# Patient Record
Sex: FEMALE | Race: WHITE | HISPANIC OR LATINO | Employment: UNEMPLOYED | URBAN - METROPOLITAN AREA
[De-identification: names, ages, dates, MRNs, and addresses within clinical notes are randomized per-mention and may not be internally consistent; named-entity substitution may affect disease eponyms.]

---

## 2017-12-20 LAB
HBV SURFACE AG SERPL QL IA: NORMAL
HIV 1+2 AB+HIV1 P24 AG SERPL QL IA: NORMAL
RUBELLA ANTIBODY IGG QUANTITATIVE: NORMAL IU/ML

## 2018-07-17 LAB
GROUP B STREP PCR: NORMAL

## 2018-08-13 ENCOUNTER — HOSPITAL ENCOUNTER (INPATIENT)
Facility: CLINIC | Age: 28
LOS: 3 days | Discharge: HOME | End: 2018-08-16
Attending: ADVANCED PRACTICE MIDWIFE | Admitting: OBSTETRICS & GYNECOLOGY
Payer: COMMERCIAL

## 2018-08-13 ENCOUNTER — ANESTHESIA EVENT (OUTPATIENT)
Dept: OBGYN | Facility: CLINIC | Age: 28
End: 2018-08-13
Payer: COMMERCIAL

## 2018-08-13 ENCOUNTER — ANESTHESIA (OUTPATIENT)
Dept: OBGYN | Facility: CLINIC | Age: 28
End: 2018-08-13
Payer: COMMERCIAL

## 2018-08-13 DIAGNOSIS — Z98.891 S/P CESAREAN SECTION: Primary | ICD-10-CM

## 2018-08-13 PROBLEM — O16.9 HYPERTENSION IN PREGNANCY: Status: ACTIVE | Noted: 2018-08-13

## 2018-08-13 LAB
ABO + RH BLD: NORMAL
ABO + RH BLD: NORMAL
ALT SERPL W P-5'-P-CCNC: 39 U/L (ref 0–50)
ALT SERPL W P-5'-P-CCNC: 61 U/L (ref 0–50)
ANION GAP SERPL CALCULATED.3IONS-SCNC: 13 MMOL/L (ref 3–14)
ANION GAP SERPL CALCULATED.3IONS-SCNC: 15 MMOL/L (ref 3–14)
AST SERPL W P-5'-P-CCNC: 39 U/L (ref 0–45)
AST SERPL W P-5'-P-CCNC: 74 U/L (ref 0–45)
BASOPHILS # BLD AUTO: 0 10E9/L (ref 0–0.2)
BASOPHILS NFR BLD AUTO: 0.1 %
BLD GP AB SCN SERPL QL: NORMAL
BLOOD BANK CMNT PATIENT-IMP: NORMAL
BUN SERPL-MCNC: 13 MG/DL (ref 7–30)
BUN SERPL-MCNC: 16 MG/DL (ref 7–30)
CALCIUM SERPL-MCNC: 8.8 MG/DL (ref 8.5–10.1)
CALCIUM SERPL-MCNC: 9.1 MG/DL (ref 8.5–10.1)
CHLORIDE SERPL-SCNC: 103 MMOL/L (ref 94–109)
CHLORIDE SERPL-SCNC: 106 MMOL/L (ref 94–109)
CO2 SERPL-SCNC: 20 MMOL/L (ref 20–32)
CO2 SERPL-SCNC: 21 MMOL/L (ref 20–32)
CREAT SERPL-MCNC: 1.1 MG/DL (ref 0.52–1.04)
CREAT SERPL-MCNC: 1.35 MG/DL (ref 0.52–1.04)
CREAT UR-MCNC: 62 MG/DL
DIFFERENTIAL METHOD BLD: ABNORMAL
EOSINOPHIL # BLD AUTO: 0 10E9/L (ref 0–0.7)
EOSINOPHIL NFR BLD AUTO: 0.1 %
ERYTHROCYTE [DISTWIDTH] IN BLOOD BY AUTOMATED COUNT: 14.1 % (ref 10–15)
ERYTHROCYTE [DISTWIDTH] IN BLOOD BY AUTOMATED COUNT: 14.1 % (ref 10–15)
GFR SERPL CREATININE-BSD FRML MDRD: 47 ML/MIN/1.7M2
GFR SERPL CREATININE-BSD FRML MDRD: 60 ML/MIN/1.7M2
GLUCOSE SERPL-MCNC: 82 MG/DL (ref 70–99)
GLUCOSE SERPL-MCNC: 86 MG/DL (ref 70–99)
HCT VFR BLD AUTO: 33.6 % (ref 35–47)
HCT VFR BLD AUTO: 35.8 % (ref 35–47)
HGB BLD-MCNC: 11.4 G/DL (ref 11.7–15.7)
HGB BLD-MCNC: 12 G/DL (ref 11.7–15.7)
IMM GRANULOCYTES # BLD: 0.1 10E9/L (ref 0–0.4)
IMM GRANULOCYTES NFR BLD: 0.4 %
LYMPHOCYTES # BLD AUTO: 1.8 10E9/L (ref 0.8–5.3)
LYMPHOCYTES NFR BLD AUTO: 11.8 %
MCH RBC QN AUTO: 27 PG (ref 26.5–33)
MCH RBC QN AUTO: 27.2 PG (ref 26.5–33)
MCHC RBC AUTO-ENTMCNC: 33.5 G/DL (ref 31.5–36.5)
MCHC RBC AUTO-ENTMCNC: 33.9 G/DL (ref 31.5–36.5)
MCV RBC AUTO: 80 FL (ref 78–100)
MCV RBC AUTO: 80 FL (ref 78–100)
MONOCYTES # BLD AUTO: 0.9 10E9/L (ref 0–1.3)
MONOCYTES NFR BLD AUTO: 6.2 %
NEUTROPHILS # BLD AUTO: 12 10E9/L (ref 1.6–8.3)
NEUTROPHILS NFR BLD AUTO: 81.4 %
NRBC # BLD AUTO: 0.1 10*3/UL
NRBC BLD AUTO-RTO: 0 /100
PLATELET # BLD AUTO: 127 10E9/L (ref 150–450)
PLATELET # BLD AUTO: 145 10E9/L (ref 150–450)
POTASSIUM SERPL-SCNC: 3.8 MMOL/L (ref 3.4–5.3)
POTASSIUM SERPL-SCNC: 4.2 MMOL/L (ref 3.4–5.3)
PROT UR-MCNC: 0.1 G/L
PROT/CREAT 24H UR: 0.15 G/G CR (ref 0–0.2)
RBC # BLD AUTO: 4.19 10E12/L (ref 3.8–5.2)
RBC # BLD AUTO: 4.45 10E12/L (ref 3.8–5.2)
SODIUM SERPL-SCNC: 139 MMOL/L (ref 133–144)
SODIUM SERPL-SCNC: 139 MMOL/L (ref 133–144)
SPECIMEN EXP DATE BLD: NORMAL
T PALLIDUM AB SER QL: NONREACTIVE
URATE SERPL-MCNC: 7.6 MG/DL (ref 2.6–6)
WBC # BLD AUTO: 14.8 10E9/L (ref 4–11)
WBC # BLD AUTO: 16.2 10E9/L (ref 4–11)

## 2018-08-13 PROCEDURE — 3E0R3BZ INTRODUCTION OF ANESTHETIC AGENT INTO SPINAL CANAL, PERCUTANEOUS APPROACH: ICD-10-PCS | Performed by: ANESTHESIOLOGY

## 2018-08-13 PROCEDURE — 85025 COMPLETE CBC W/AUTO DIFF WBC: CPT | Performed by: ADVANCED PRACTICE MIDWIFE

## 2018-08-13 PROCEDURE — 36415 COLL VENOUS BLD VENIPUNCTURE: CPT | Performed by: ADVANCED PRACTICE MIDWIFE

## 2018-08-13 PROCEDURE — 10907ZC DRAINAGE OF AMNIOTIC FLUID, THERAPEUTIC FROM PRODUCTS OF CONCEPTION, VIA NATURAL OR ARTIFICIAL OPENING: ICD-10-PCS | Performed by: ADVANCED PRACTICE MIDWIFE

## 2018-08-13 PROCEDURE — 85027 COMPLETE CBC AUTOMATED: CPT | Performed by: ADVANCED PRACTICE MIDWIFE

## 2018-08-13 PROCEDURE — 86780 TREPONEMA PALLIDUM: CPT | Performed by: ADVANCED PRACTICE MIDWIFE

## 2018-08-13 PROCEDURE — 86900 BLOOD TYPING SEROLOGIC ABO: CPT | Performed by: ADVANCED PRACTICE MIDWIFE

## 2018-08-13 PROCEDURE — 84450 TRANSFERASE (AST) (SGOT): CPT | Performed by: ADVANCED PRACTICE MIDWIFE

## 2018-08-13 PROCEDURE — 84460 ALANINE AMINO (ALT) (SGPT): CPT | Performed by: ADVANCED PRACTICE MIDWIFE

## 2018-08-13 PROCEDURE — 12000030 ZZH R&B OB INTERMEDIATE UMMC

## 2018-08-13 PROCEDURE — 80048 BASIC METABOLIC PNL TOTAL CA: CPT | Performed by: ADVANCED PRACTICE MIDWIFE

## 2018-08-13 PROCEDURE — 25000131 ZZH RX MED GY IP 250 OP 636 PS 637: Performed by: ADVANCED PRACTICE MIDWIFE

## 2018-08-13 PROCEDURE — 86850 RBC ANTIBODY SCREEN: CPT | Performed by: ADVANCED PRACTICE MIDWIFE

## 2018-08-13 PROCEDURE — 84156 ASSAY OF PROTEIN URINE: CPT | Performed by: ADVANCED PRACTICE MIDWIFE

## 2018-08-13 PROCEDURE — 25000128 H RX IP 250 OP 636: Performed by: STUDENT IN AN ORGANIZED HEALTH CARE EDUCATION/TRAINING PROGRAM

## 2018-08-13 PROCEDURE — 25000125 ZZHC RX 250

## 2018-08-13 PROCEDURE — 82565 ASSAY OF CREATININE: CPT | Performed by: ADVANCED PRACTICE MIDWIFE

## 2018-08-13 PROCEDURE — 25000128 H RX IP 250 OP 636: Performed by: ADVANCED PRACTICE MIDWIFE

## 2018-08-13 PROCEDURE — 86901 BLOOD TYPING SEROLOGIC RH(D): CPT | Performed by: ADVANCED PRACTICE MIDWIFE

## 2018-08-13 PROCEDURE — 25000128 H RX IP 250 OP 636

## 2018-08-13 PROCEDURE — 84550 ASSAY OF BLOOD/URIC ACID: CPT | Performed by: ADVANCED PRACTICE MIDWIFE

## 2018-08-13 PROCEDURE — 82570 ASSAY OF URINE CREATININE: CPT | Performed by: ADVANCED PRACTICE MIDWIFE

## 2018-08-13 PROCEDURE — 00HU33Z INSERTION OF INFUSION DEVICE INTO SPINAL CANAL, PERCUTANEOUS APPROACH: ICD-10-PCS | Performed by: ANESTHESIOLOGY

## 2018-08-13 RX ORDER — EPHEDRINE SULFATE 50 MG/ML
5 INJECTION, SOLUTION INTRAMUSCULAR; INTRAVENOUS; SUBCUTANEOUS
Status: DISCONTINUED | OUTPATIENT
Start: 2018-08-13 | End: 2018-08-16 | Stop reason: HOSPADM

## 2018-08-13 RX ORDER — ONDANSETRON 2 MG/ML
4 INJECTION INTRAMUSCULAR; INTRAVENOUS EVERY 6 HOURS PRN
Status: DISCONTINUED | OUTPATIENT
Start: 2018-08-13 | End: 2018-08-14

## 2018-08-13 RX ORDER — MAGNESIUM SULFATE HEPTAHYDRATE 40 MG/ML
4 INJECTION, SOLUTION INTRAVENOUS
Status: DISCONTINUED | OUTPATIENT
Start: 2018-08-13 | End: 2018-08-16 | Stop reason: HOSPADM

## 2018-08-13 RX ORDER — NALBUPHINE HYDROCHLORIDE 10 MG/ML
2.5-5 INJECTION, SOLUTION INTRAMUSCULAR; INTRAVENOUS; SUBCUTANEOUS EVERY 6 HOURS PRN
Status: DISCONTINUED | OUTPATIENT
Start: 2018-08-13 | End: 2018-08-16 | Stop reason: HOSPADM

## 2018-08-13 RX ORDER — ACETAMINOPHEN 325 MG/1
650 TABLET ORAL EVERY 4 HOURS PRN
Status: DISCONTINUED | OUTPATIENT
Start: 2018-08-13 | End: 2018-08-14

## 2018-08-13 RX ORDER — NIFEDIPINE 10 MG/1
10 CAPSULE ORAL
Status: DISCONTINUED | OUTPATIENT
Start: 2018-08-13 | End: 2018-08-16 | Stop reason: HOSPADM

## 2018-08-13 RX ORDER — FENTANYL CITRATE 50 UG/ML
50-100 INJECTION, SOLUTION INTRAMUSCULAR; INTRAVENOUS
Status: DISCONTINUED | OUTPATIENT
Start: 2018-08-13 | End: 2018-08-14

## 2018-08-13 RX ORDER — CITRIC ACID/SODIUM CITRATE 334-500MG
30 SOLUTION, ORAL ORAL ONCE
Status: DISCONTINUED | OUTPATIENT
Start: 2018-08-13 | End: 2018-08-14

## 2018-08-13 RX ORDER — LIDOCAINE HYDROCHLORIDE 10 MG/ML
INJECTION, SOLUTION EPIDURAL; INFILTRATION; INTRACAUDAL; PERINEURAL
Status: DISCONTINUED
Start: 2018-08-13 | End: 2018-08-14 | Stop reason: HOSPADM

## 2018-08-13 RX ORDER — OXYTOCIN/0.9 % SODIUM CHLORIDE 30/500 ML
PLASTIC BAG, INJECTION (ML) INTRAVENOUS
Status: COMPLETED
Start: 2018-08-13 | End: 2018-08-13

## 2018-08-13 RX ORDER — NALBUPHINE HYDROCHLORIDE 10 MG/ML
10-20 INJECTION, SOLUTION INTRAMUSCULAR; INTRAVENOUS; SUBCUTANEOUS
Status: DISCONTINUED | OUTPATIENT
Start: 2018-08-13 | End: 2018-08-14

## 2018-08-13 RX ORDER — OXYTOCIN/0.9 % SODIUM CHLORIDE 30/500 ML
100-340 PLASTIC BAG, INJECTION (ML) INTRAVENOUS CONTINUOUS PRN
Status: COMPLETED | OUTPATIENT
Start: 2018-08-13 | End: 2018-08-14

## 2018-08-13 RX ORDER — MAGNESIUM SULFATE HEPTAHYDRATE 500 MG/ML
4 INJECTION, SOLUTION INTRAMUSCULAR; INTRAVENOUS
Status: DISCONTINUED | OUTPATIENT
Start: 2018-08-13 | End: 2018-08-16 | Stop reason: HOSPADM

## 2018-08-13 RX ORDER — OXYTOCIN/0.9 % SODIUM CHLORIDE 30/500 ML
1-24 PLASTIC BAG, INJECTION (ML) INTRAVENOUS CONTINUOUS
Status: DISCONTINUED | OUTPATIENT
Start: 2018-08-13 | End: 2018-08-16 | Stop reason: HOSPADM

## 2018-08-13 RX ORDER — LABETALOL HYDROCHLORIDE 5 MG/ML
40 INJECTION, SOLUTION INTRAVENOUS EVERY 10 MIN PRN
Status: DISCONTINUED | OUTPATIENT
Start: 2018-08-13 | End: 2018-08-16 | Stop reason: HOSPADM

## 2018-08-13 RX ORDER — LIDOCAINE HYDROCHLORIDE AND EPINEPHRINE 15; 5 MG/ML; UG/ML
INJECTION, SOLUTION EPIDURAL PRN
Status: DISCONTINUED | OUTPATIENT
Start: 2018-08-13 | End: 2018-08-15 | Stop reason: HOSPADM

## 2018-08-13 RX ORDER — MISOPROSTOL 200 UG/1
TABLET ORAL
Status: DISCONTINUED
Start: 2018-08-13 | End: 2018-08-14 | Stop reason: HOSPADM

## 2018-08-13 RX ORDER — MAGNESIUM SULFATE HEPTAHYDRATE 40 MG/ML
4 INJECTION, SOLUTION INTRAVENOUS ONCE
Status: COMPLETED | OUTPATIENT
Start: 2018-08-13 | End: 2018-08-13

## 2018-08-13 RX ORDER — LABETALOL HYDROCHLORIDE 5 MG/ML
20 INJECTION, SOLUTION INTRAVENOUS EVERY 10 MIN PRN
Status: DISCONTINUED | OUTPATIENT
Start: 2018-08-13 | End: 2018-08-16 | Stop reason: HOSPADM

## 2018-08-13 RX ORDER — IBUPROFEN 800 MG/1
800 TABLET, FILM COATED ORAL
Status: DISCONTINUED | OUTPATIENT
Start: 2018-08-13 | End: 2018-08-14

## 2018-08-13 RX ORDER — CARBOPROST TROMETHAMINE 250 UG/ML
250 INJECTION, SOLUTION INTRAMUSCULAR
Status: DISCONTINUED | OUTPATIENT
Start: 2018-08-13 | End: 2018-08-14

## 2018-08-13 RX ORDER — NALOXONE HYDROCHLORIDE 0.4 MG/ML
.1-.4 INJECTION, SOLUTION INTRAMUSCULAR; INTRAVENOUS; SUBCUTANEOUS
Status: DISCONTINUED | OUTPATIENT
Start: 2018-08-13 | End: 2018-08-16 | Stop reason: HOSPADM

## 2018-08-13 RX ORDER — MAGNESIUM SULFATE HEPTAHYDRATE 40 MG/ML
2 INJECTION, SOLUTION INTRAVENOUS
Status: DISCONTINUED | OUTPATIENT
Start: 2018-08-13 | End: 2018-08-16 | Stop reason: HOSPADM

## 2018-08-13 RX ORDER — NALOXONE HYDROCHLORIDE 0.4 MG/ML
.1-.4 INJECTION, SOLUTION INTRAMUSCULAR; INTRAVENOUS; SUBCUTANEOUS
Status: DISCONTINUED | OUTPATIENT
Start: 2018-08-13 | End: 2018-08-14

## 2018-08-13 RX ORDER — METHYLERGONOVINE MALEATE 0.2 MG/ML
200 INJECTION INTRAVENOUS
Status: DISCONTINUED | OUTPATIENT
Start: 2018-08-13 | End: 2018-08-14

## 2018-08-13 RX ORDER — PRENATAL VIT/IRON FUM/FOLIC AC 27MG-0.8MG
1 TABLET ORAL DAILY
Status: ON HOLD | COMMUNITY
End: 2018-08-16

## 2018-08-13 RX ORDER — SODIUM CHLORIDE, SODIUM LACTATE, POTASSIUM CHLORIDE, CALCIUM CHLORIDE 600; 310; 30; 20 MG/100ML; MG/100ML; MG/100ML; MG/100ML
INJECTION, SOLUTION INTRAVENOUS CONTINUOUS
Status: DISCONTINUED | OUTPATIENT
Start: 2018-08-13 | End: 2018-08-16 | Stop reason: HOSPADM

## 2018-08-13 RX ORDER — LORAZEPAM 2 MG/ML
2 INJECTION INTRAMUSCULAR
Status: DISCONTINUED | OUTPATIENT
Start: 2018-08-13 | End: 2018-08-16 | Stop reason: HOSPADM

## 2018-08-13 RX ORDER — LIDOCAINE 40 MG/G
CREAM TOPICAL
Status: DISCONTINUED | OUTPATIENT
Start: 2018-08-13 | End: 2018-08-14

## 2018-08-13 RX ORDER — MAGNESIUM SULFATE IN WATER 40 MG/ML
1.5 INJECTION, SOLUTION INTRAVENOUS CONTINUOUS
Status: DISCONTINUED | OUTPATIENT
Start: 2018-08-13 | End: 2018-08-15

## 2018-08-13 RX ORDER — CALCIUM GLUCONATE 94 MG/ML
1 INJECTION, SOLUTION INTRAVENOUS
Status: DISCONTINUED | OUTPATIENT
Start: 2018-08-13 | End: 2018-08-16 | Stop reason: HOSPADM

## 2018-08-13 RX ORDER — LABETALOL HYDROCHLORIDE 5 MG/ML
80 INJECTION, SOLUTION INTRAVENOUS EVERY 10 MIN PRN
Status: DISCONTINUED | OUTPATIENT
Start: 2018-08-13 | End: 2018-08-16 | Stop reason: HOSPADM

## 2018-08-13 RX ORDER — OXYTOCIN 10 [USP'U]/ML
10 INJECTION, SOLUTION INTRAMUSCULAR; INTRAVENOUS
Status: DISCONTINUED | OUTPATIENT
Start: 2018-08-13 | End: 2018-08-14

## 2018-08-13 RX ORDER — OXYCODONE AND ACETAMINOPHEN 5; 325 MG/1; MG/1
1 TABLET ORAL
Status: DISCONTINUED | OUTPATIENT
Start: 2018-08-13 | End: 2018-08-14

## 2018-08-13 RX ORDER — SODIUM CHLORIDE, SODIUM LACTATE, POTASSIUM CHLORIDE, CALCIUM CHLORIDE 600; 310; 30; 20 MG/100ML; MG/100ML; MG/100ML; MG/100ML
10-125 INJECTION, SOLUTION INTRAVENOUS CONTINUOUS
Status: DISCONTINUED | OUTPATIENT
Start: 2018-08-13 | End: 2018-08-16 | Stop reason: HOSPADM

## 2018-08-13 RX ORDER — OXYTOCIN 10 [USP'U]/ML
INJECTION, SOLUTION INTRAMUSCULAR; INTRAVENOUS
Status: DISCONTINUED
Start: 2018-08-13 | End: 2018-08-14 | Stop reason: HOSPADM

## 2018-08-13 RX ADMIN — MAGNESIUM SULFATE IN WATER 4 G: 40 INJECTION, SOLUTION INTRAVENOUS at 23:17

## 2018-08-13 RX ADMIN — Medication 20 MG: at 22:24

## 2018-08-13 RX ADMIN — FENTANYL CITRATE 50 MCG: 50 INJECTION, SOLUTION INTRAMUSCULAR; INTRAVENOUS at 13:07

## 2018-08-13 RX ADMIN — MAGNESIUM SULFATE IN WATER 2 G/HR: 40 INJECTION, SOLUTION INTRAVENOUS at 23:51

## 2018-08-13 RX ADMIN — Medication 2 MILLI-UNITS/MIN: at 15:39

## 2018-08-13 RX ADMIN — Medication 8 ML: at 14:47

## 2018-08-13 RX ADMIN — FENTANYL CITRATE 5 MCG: 50 INJECTION, SOLUTION INTRAMUSCULAR; INTRAVENOUS at 12:57

## 2018-08-13 RX ADMIN — SODIUM CHLORIDE, POTASSIUM CHLORIDE, SODIUM LACTATE AND CALCIUM CHLORIDE 250 ML: 600; 310; 30; 20 INJECTION, SOLUTION INTRAVENOUS at 18:56

## 2018-08-13 RX ADMIN — SODIUM CHLORIDE, POTASSIUM CHLORIDE, SODIUM LACTATE AND CALCIUM CHLORIDE 250 ML: 600; 310; 30; 20 INJECTION, SOLUTION INTRAVENOUS at 11:36

## 2018-08-13 RX ADMIN — LIDOCAINE HYDROCHLORIDE,EPINEPHRINE BITARTRATE 3 ML: 15; .005 INJECTION, SOLUTION EPIDURAL; INFILTRATION; INTRACAUDAL; PERINEURAL at 14:43

## 2018-08-13 RX ADMIN — SODIUM CHLORIDE, POTASSIUM CHLORIDE, SODIUM LACTATE AND CALCIUM CHLORIDE: 600; 310; 30; 20 INJECTION, SOLUTION INTRAVENOUS at 20:05

## 2018-08-13 RX ADMIN — Medication: at 14:40

## 2018-08-13 ASSESSMENT — ACTIVITIES OF DAILY LIVING (ADL)
DRESS: 0-->INDEPENDENT
COGNITION: 0 - NO COGNITION ISSUES REPORTED
TOILETING: 0-->INDEPENDENT
RETIRED_COMMUNICATION: 0-->UNDERSTANDS/COMMUNICATES WITHOUT DIFFICULTY
SWALLOWING: 0-->SWALLOWS FOODS/LIQUIDS WITHOUT DIFFICULTY
RETIRED_EATING: 0-->INDEPENDENT
FALL_HISTORY_WITHIN_LAST_SIX_MONTHS: NO
BATHING: 0-->INDEPENDENT
AMBULATION: 0-->INDEPENDENT
TRANSFERRING: 0-->INDEPENDENT

## 2018-08-13 NOTE — PROGRESS NOTES
Labor progress note    S: Pt has been coping well overall with labor but is starting to verbalize some frustration with the perceived length of labor. Noted change in energy with contractions - was previously able to chat/joke but is now having eyes closed and appears more focused.  FOB, , and mother in law remain supportive.  Pt tolerating clears, sipping on water but not as hungry.      O:  Blood pressure 140/90, temperature (P) 97.8  F (36.6  C), temperature source (P) Oral, resp. rate 18.  General appearance: uncomfortable with contractions but coping overall.  Contractions: Every 3-7 minutes. 60-90 seconds duration.  Palpate: moderate.  Soft resting tone.   FHT: Baseline 140 with moderate variability with periods of minimal. Accelerations not present. no decelerations present.  ROM: clear fluid. Membranes have been ruptured for 2 hours.  PELVIC EXAM:deferred    Pitocin- none,  Antibiotics- none  IV saline lock        # Pain Assessment:    - Sherlyn is experiencing pain due to labor. Pain management was discussed with Sherlyn and her significant other. Mother in law and doual and the plan was created in a collaborative fashion.  Robertjaimienarendra's response to the current recommendations: mixed response  - Non-pharmacologic adjuvants: Heat, Massage and positioning and continuous labor support from FOB and   - Encouraged continued position changes to facilitate fetal rotation/descnet including peanut ball, hands and knees, and rebozzo.  Pt agreed to try peanut ball.       A:  27 year old  with IUP @ 40w4d Pre eclampsia without severe features  Active labor   AROM <18 hours, clear, afebrile  Fetal Heart Rate Tracing category two  GBS- negative  Gestational Thrombocytopenia  Transfer from [facility] [place]    Patient Active Problem List   Diagnosis     Encounter for triage in pregnant patient     Hypertension in pregnancy         P:  Ambulation, hydration, position changes, birthing ball/sling and  tub options to facilitate labor. Pt assisted to left lateral with peanut ball.    Pain medication options reviewed with pt. Pt is interested in continued non pharmacologic options.  Declines hydrotherapy or certain recommended positioning.   Reviewed continued periods of  Category 2 FHR tracing, pt agreeable to IV fluid bolus and then continuous LR at 75ml as she is drinking less fluids. Continue close surveillance and consult MD if persistent or worsening category 2 FHR tracing despite interventions  Consider oepertaive Discuss labor augmentation with Pitocin prn if no cervical change with next SVE.    Reevaluate in 2-4 hours prn    Ernestina [facility] APRN, CNM        ADDENDUM 8/13/2018 1255  Pt noted tearful with contractions, verbalizing with each one.  Appears to be coping less effectively with current non pharmacologic options.  Pt allowed CNM to perform rebozzo x2 in hands and knees then stood and declined additional attempts.  Sat on birthing ball for ~2 contractions then stood again. Tried standing lunges for 1 contraction then stopped.  Declines hydrotherapy with bath or shower. Declines labor sling. Declines CUB or supported hands and knees.   Continued category 2 FHR tracing with periods of minimal variability. Pt consented to SVE - noted 6/80/-2 with bulging forebag.  AROM of forebag with pt consent for moderate clear fluid.  +scant bloody show.  Suspected OP position by SVE, confirmed with BSUS.  Reviewed with pt and family support recommendations for facilitating fetal rotation/descent including non pharmacologic options as above. Reviewed IV Fentanyl now that moderate variability returned.  Pt agreeable, will have IV Fentanyl and rest with peanut ball to facilitate fetal rotation/descent.   Plan recheck SVE in 2-4 hours and if no change plan pitocin augmentation if pt consents.    BPs remain mild range, asymptomatic - continue plan of care. Reviewed Category 2 FHR tracing with pt and family support, if  worsening despite interventions and remote from delivery consider operative delivery prn.  Ernestina Victoria APRN, CNM

## 2018-08-13 NOTE — PLAN OF CARE
Problem: Patient Care Overview  Goal: Plan of Care/Patient Progress Review  Outcome: No Change  Data: Patient presented to [facility] at 0634.   Reason for maternal/fetal assessment per patient is increases in BP's at [facility]- transfer of care to [facility].  Patient is a . Prenatal record reviewed.      Obstetric History       T0      L0     SAB0   TAB0   Ectopic0   Multiple0   Live Births0       # Outcome Date GA Lbr Sam/2nd Weight Sex Delivery Anes PTL Lv   1 Current               . Medical history: History reviewed. No pertinent past medical history.. Gestational Age 40w4d. VSS. Fetal movement present. Patient complains of contractions beginning during the day yesterday that worsened at about midnight last night. Patient went into [facility] at 0300 and her BP's were increasing (highest 150's diastolic) which warranted transfer of care. Patient was 5-6cm before transfer. Ernestina Victoria CNM notified of patient arrival and labor status- BP upon arrival 136/87. FHT: 150, minimal variability, no accelerations and no decelerations. Provider notified. Plan to give fluids, continue to monitor and anticipate . Contractions Q2-7 minutes and palpate moderate. Report given to Daksha Dorsey RN.

## 2018-08-13 NOTE — ANESTHESIA PROCEDURE NOTES
Epidural Procedure Note    Staff:     Anesthesiologist:  BUDDY DIEHL    Resident/CRNA:  LOPEZ MAHMOOD    Procedure performed by resident/CRNA in the presence of a teaching physician    Location: OB     Procedure start time:  8/13/2018 2:25 PM     Procedure end time:  8/13/2018 2:48 PM   Pre-procedure checklist:   patient identified, IV checked, site marked, risks and benefits discussed, informed consent, monitors and equipment checked, pre-op evaluation, at physician/surgeon's request and post-op pain management      Correct Patient: Yes      Correct Position: Yes      Correct Site: Yes      Correct Procedure: Yes      Correct Laterality:  Yes    Site Marked:  Yes  Procedure:     Procedure:  Epidural catheter    ASA:  2    Position:  Sitting    Sterile Prep: chloraprep      Insertion site:  L4-5    Local skin infiltration:  1% lidocaine    amount (mL):  4    Approach:  Midline    Needle gauge (G):  17    Needle Length (in):  3.5    Block Needle Type:  Touhy    Injection Technique:  LORT saline    LY at (cm):  7    Attempts:  2    Redirects:  1    Catheter gauge (G):  19    Catheter threaded easily: Yes      Threaded to cm at skin:  11    Threaded in epidural space (cm):  4    Paresthesias:  No    Aspiration negative for Heme or CSF: Yes       Local anesthetic:  Lidocaine 1.5% w/ 1:200,000 epinephrine    Test dose time:  14:43    Test dose negative for signs of intravascular, subdural or intrathecal injection: Yes

## 2018-08-13 NOTE — IP AVS SNAPSHOT
MRN:[id number]                      After Visit Summary   8/13/2018    Sherlyn Owusu    MRN: [id number]           Thank you!     Thank you for choosing [facility] for your care. Our goal is always to provide you with excellent care. Hearing back from our patients is one way we can continue to improve our services. Please take a few minutes to complete the written survey that you may receive in the mail after you visit with us. Thank you!        Patient Information     Date Of Birth          1990        Designated Caregiver       Most Recent Value    Caregiver    Will someone help with your care after discharge? no      About your hospital stay     You were admitted on:  August 13, 2018 You last received care in the:  [facility]    You were discharged on:  August 16, 2018        Reason for your hospital stay       Maternity care                  Who to Call     For medical emergencies, please call 911.  For non-urgent questions about your medical care, please call your primary care provider or clinic, None  For questions related to your surgery, please call your surgery clinic        Attending Provider     Provider Specialty    Alcides, Kodi Loera, JENNIFER Midwives    Ernestina Victoria, APRN CN Midwives    Roxanna Hansen, HERMANN CN OB/Gyn    Sandra Preciado MD OB/Gyn       Primary Care Provider Fax #    Physician No Ref-Primary [phone]      After Care Instructions     Activity       Review discharge instructions            Diet       Resume previous diet            Discharge Instructions - Hypertensive disorders patients       High Blood pressure patients to follow up in clinic or via home care within one week for a blood pressure check            Discharge Instructions - Postpartum visit       Schedule postpartum visit with your provider and return to clinic in 6 weeks.                  Follow-up Appointments     Follow Up and recommended labs  and tests       Follow up for blood pressure check and repeat labs (kidney function, liver function, blood count) on Monday or Tuesday                  Further instructions from your care team       Postop  Birth Instructions    Activity       Do not lift more than 10 pounds for 6 weeks after surgery.  Ask family and friends for help when you need it.    No driving until you have stopped taking your pain medications (usually two weeks after surgery).    No heavy exercise or activity for 6 weeks.  Don't do anything that will put a strain on your surgery site.    Don't strain when using the toilet.  Your care team may prescribe a stool softener if you have problems with your bowel movements.     To care for your incision:       Keep the incision clean and dry.    Do not soak your incision in water. No swimming or hot tubs until it has fully healed. You may soak in the bathtub if the water level is below your incision.    Do not use peroxide, gel, cream, lotion, or ointment on your incision.    Adjust your clothes to avoid pressure on your surgery site (check the elastic in your underwear for example).     You may see a small amount of clear or pink drainage and this is normal.  Check with your health care provider:       If the drainage increases or has an odor.    If the incision reddens, you have swelling, or develop a rash.    If you have increased pain and the medicine we prescribed doesn't help.    If you have a fever above 100.4 F (38 C) with or without chills when placing thermometer under your tongue.   The area around your incision (surgery wound), will feel numb.  This is normal. The numbness should go away in less than a year.     Keep your hands clean:  Always wash your hands before touching your incision (surgery wound). This helps reduce your risk of infection. If your hands aren't dirty, you may use an alcohol hand-rub to clean your hands. Keep your nails clean and short.    Call your healthcare  "provider if you have any of these symptoms:       You soak a sanitary pad with blood within 1 hour, or you see blood clots larger than a golf ball.    Bleeding that lasts more than 6 weeks.    Vaginal discharge that smells bad.    Severe pain, cramping or tenderness in your lower belly area.    A need to urinate more frequently (use the toilet more often), more urgently (use the toilet very quickly), or it burns when you urinate.    Nausea and vomiting.    Redness, swelling or pain around a vein in your leg.    Problems breastfeeding or a red or painful area on your breast.    Chest pain and cough or are gasping for air.    Problems with coping with sadness, anxiety or depression. If you have concerns about hurting yourself or the baby, call your provider immediately.      You have questions or concerns after you return home.                  Pending Results     No orders found from 8/11/2018 to 8/14/2018.            Statement of Approval     Ordered          08/16/18 1052  I have reviewed and agree with all the recommendations and orders detailed in this document.  EFFECTIVE NOW     Approved and electronically signed by:  Marah Antunez MD             Admission Information     Date & Time Provider Department Dept. Phone    8/13/2018 Sandra Preciado MD [facility] [phone]      Your Vitals Were     Blood Pressure Pulse Temperature Respirations Weight Pulse Oximetry    135/85 90 98.9  F (37.2  C) (Oral) 16 78.5 kg (173 lb 0.9 oz) 100%      MyChart Information     [employer] lets you send messages to your doctor, view your test results, renew your prescriptions, schedule appointments and more. To sign up, go to [email or web address] . Click on \"Log in\" on the left side of the screen, which will take you to the Welcome page. Then click on \"Sign up Now\" on the right side of the page.     You will be asked to enter the access code listed below, as well as some personal information. Please follow the directions " to create your username and password.     Your access code is: [email or web address]  Expires: 2018 11:18 AM     Your access code will  in 90 days. If you need help or a new code, please call your [facility] clinic or [phone].        Care EveryWhere ID     This is your Care EveryWhere ID. This could be used by other organizations to access your [facility] medical records  [id number]        Equal Access to Services     JEAN CHATTERJEE : Hadii liam spencer hadasho Soomaali, waaxda luqadaha, qaybta kaalmada adeegyada, waxtaylor gaffney hayregulo manuelbendayron bassett . So [facility] [phone].    ATENCIÓN: Si habla español, tiene a brooks disposición servicios gratuitos de asistencia lingüística. Keoame al [phone].    We comply with applicable federal civil rights laws and [place] laws. We do not discriminate on the basis of race, color, national origin, age, disability, sex, sexual orientation, or gender identity.               Review of your medicines      START taking        Dose / Directions    acetaminophen 325 MG tablet   Commonly known as:  TYLENOL        Dose:  650 mg   Start taking on:  2018   Take 2 tablets (650 mg) by mouth every 4 hours as needed for mild pain or pain   Quantity:  100 tablet   Refills:  0       ferrous sulfate 325 (65 Fe) MG tablet   Commonly known as:  IRON        Dose:  325 mg   Take 1 tablet (325 mg) by mouth daily (with breakfast)   Quantity:  90 tablet   Refills:  2       oxyCODONE IR 5 MG tablet   Commonly known as:  ROXICODONE        Dose:  5-10 mg   Take 1-2 tablets (5-10 mg) by mouth every 3 hours as needed for other (pain control or improvement in physical function. Hold dose for analgesic side effects.)   Quantity:  15 tablet   Refills:  0       senna-docusate 8.6-50 MG per tablet   Commonly known as:  SENOKOT-S;PERICOLACE        Dose:  2 tablet   Take 2 tablets by mouth 2 times daily as needed for constipation   Quantity:  100 tablet   Refills:  0         CONTINUE these medicines which  "have NOT CHANGED        Dose / Directions    prenatal multivitamin plus iron 27-0.8 MG Tabs per tablet        Dose:  1 tablet   Take 1 tablet by mouth daily   Quantity:  100 tablet   Refills:  3            Where to get your medicines      These medications were sent to [facility] [place], [place] - [address]  [address], [facility] [address]     Phone:  [phone]     acetaminophen 325 MG tablet    ferrous sulfate 325 (65 Fe) MG tablet    prenatal multivitamin plus iron 27-0.8 MG Tabs per tablet    senna-docusate 8.6-50 MG per tablet         Some of these will need a paper prescription and others can be bought over the counter. Ask your nurse if you have questions.     Bring a paper prescription for each of these medications     oxyCODONE IR 5 MG tablet                Protect others around you: Learn how to safely use, store and throw away your medicines at www.disposemymeds.org.        Information about your nerve block     Today you received a block to numb the nerves near your surgery site.    This is a block using local anesthetic or \"numbing\" medication injected around the nerves to anesthetize or \"numb\" the area supplied by those nerves. This block is injected into the muscle layer near your surgical site. The type of anesthesia (Exparel) your anesthesia team used to numb your abdomen may give you relief for up to 72 hours.     Diet: There are no diet restrictions, but you should drink plenty of fluids, unless you are on a fluid-restricted diet.     Activity: If your surgical site is an arm or leg you should be careful with your affected limb, since it is possible to injure your limb without being aware of it due to the numbing. Until full feeling returns, you should guard against bumping or hitting your limb, and avoid extreme hot or cold temperatures on the skin.    Pain Medication: As the block wears off, the feeling will return as a tingling or prickly sensation near " your surgical site. You will experience more discomfort from your incisions as the feeling returns. You may want to take a pain pill (a narcotic or Tylenol if this was prescribed by your surgeon) when you start to experience mild pain, before the pain becomes more severe. If your pain medications do not control your pain, you should notify your surgeon. If you are taking narcotics for pain management, do not drink alcohol, drive a car, or perform hazardous activities.  If you have questions or concerns you may call your surgeon at the number provided with your discharge instructions.     Call your surgeon if you experience blurry vision, ringing in the ears or metallic taste in your mouth.         Information about OPIOIDS     PRESCRIPTION OPIOIDS: WHAT YOU NEED TO KNOW   We gave you an opioid (narcotic) pain medicine. It is important to manage your pain, but opioids are not always the best choice. You should first try all the other options your care team gave you. Take this medicine for as short a time (and as few doses) as possible.    Some activities can increase your pain, such as bandage changes or therapy sessions. It may help to take your pain medicine 30 to 60 minutes before these activities. Reduce your stress by getting enough sleep, working on hobbies you enjoy and practicing relaxation or meditation. Talk to your care team about ways to manage your pain beyond prescription opioids.    These medicines have risks:    DO NOT drive when on new or higher doses of pain medicine. These medicines can affect your alertness and reaction times, and you could be arrested for driving under the influence (DUI). If you need to use opioids long-term, talk to your care team about driving.    DO NOT operate heavy machinery    DO NOT do any other dangerous activities while taking these medicines.    DO NOT drink any alcohol while taking these medicines.     If the opioid prescribed includes acetaminophen, DO NOT take with  any other medicines that contain acetaminophen. Read all labels carefully. Look for the word  acetaminophen  or  Tylenol.  Ask your pharmacist if you have questions or are unsure.    You can get addicted to pain medicines, especially if you have a history of addiction (chemical, alcohol or substance dependence). Talk to your care team about ways to reduce this risk.    All opioids tend to cause constipation. Drink plenty of water and eat foods that have a lot of fiber, such as fruits, vegetables, prune juice, apple juice and high-fiber cereal. Take a laxative (Miralax, milk of magnesia, Colace, Senna) if you don t move your bowels at least every other day. Other side effects include upset stomach, sleepiness, dizziness, throwing up, tolerance (needing more of the medicine to have the same effect), physical dependence and slowed breathing.    Store your pills in a secure place, locked if possible. We will not replace any lost or stolen medicine. If you don t finish your medicine, please throw away (dispose) as directed by your pharmacist. The [place] Pollution Control Agency has more information about safe disposal: https://www.[address].[facility].mn.us/living-green/managing-unwanted-medications             Medication List: This is a list of all your medications and when to take them. Check marks below indicate your daily home schedule. Keep this list as a reference.      Medications           Morning Afternoon Evening Bedtime As Needed    acetaminophen 325 MG tablet   Commonly known as:  TYLENOL   Take 2 tablets (650 mg) by mouth every 4 hours as needed for mild pain or pain   Start taking on:  8/17/2018   Last time this was given:  975 mg on 8/16/2018  5:19 AM                                ferrous sulfate 325 (65 Fe) MG tablet   Commonly known as:  IRON   Take 1 tablet (325 mg) by mouth daily (with breakfast)                                oxyCODONE IR 5 MG tablet   Commonly known as:  ROXICODONE   Take 1-2 tablets (5-10  mg) by mouth every 3 hours as needed for other (pain control or improvement in physical function. Hold dose for analgesic side effects.)   Last time this was given:  5 mg on 8/15/2018  7:38 PM                                prenatal multivitamin plus iron 27-0.8 MG Tabs per tablet   Take 1 tablet by mouth daily                                senna-docusate 8.6-50 MG per tablet   Commonly known as:  SENOKOT-S;PERICOLACE   Take 2 tablets by mouth 2 times daily as needed for constipation   Last time this was given:  2 tablets on 8/15/2018  7:38 PM

## 2018-08-13 NOTE — PROVIDER NOTIFICATION
08/13/18 1352   Provider Notification   Provider Name/Title ALDAIR [facility]   Method of Notification Electronic Page   Request Evaluate - Remote   Notification Reason Uterine Activity;Other (Comment)   I see orders for Pitocin- want it started now or?? Call me Thanks!

## 2018-08-13 NOTE — ANESTHESIA PREPROCEDURE EVALUATION
"Anesthesia Evaluation     .             ROS/MED HX    ENT/Pulmonary:  - neg pulmonary ROS     Neurologic:  - neg neurologic ROS     Cardiovascular:     (+) hypertension----. : . . . :. .       METS/Exercise Tolerance:     Hematologic:  - neg hematologic  ROS       Musculoskeletal:  - neg musculoskeletal ROS       GI/Hepatic:  - neg GI/hepatic ROS       Renal/Genitourinary:  - ROS Renal section negative       Endo:  - neg endo ROS       Psychiatric:  - neg psychiatric ROS       Infectious Disease:  - neg infectious disease ROS       Malignancy:      - no malignancy   Other:                     Physical Exam  Normal systems: cardiovascular    Airway   Mallampati: II  TM distance: >3 FB  Neck ROM: full    Dental     Cardiovascular       Pulmonary                     Anesthesia Plan      History & Physical Review      ASA Status:  2 .  OB Epidural Asa: 2       Plan for Epidural          Postoperative Care      Consents  Anesthetic plan, risks, benefits and alternatives discussed with:  Patient..      Medical history notable for Gestational Thrombocytopenia of 135 on 8/7/18, PCOS, Sickle Cell Carrier, Remote h/o UTI (noted last ~10 years ago)  - Prenatal chart problem list notable for \"asymptomatic varicose veins\".   ANALIA Perales MD  August 13, 2018      "

## 2018-08-13 NOTE — IP AVS SNAPSHOT
[facility]    [address] [address]    Phone:  [phone]                                       After Visit Summary   8/13/2018    Sherlyn Owusu    MRN: [id number]           After Visit Summary Signature Page     I have received my discharge instructions, and my questions have been answered. I have discussed any challenges I see with this plan with the nurse or doctor.    ..........................................................................................................................................  Patient/Patient Representative Signature      ..........................................................................................................................................  Patient Representative Print Name and Relationship to Patient    ..................................................               ................................................  Date                                            Time    ..........................................................................................................................................  Reviewed by Signature/Title    ...................................................              ..............................................  Date                                                            Time

## 2018-08-13 NOTE — PROVIDER NOTIFICATION
08/13/18 1109   Provider Notification   Provider Name/Title ALDAIR Victoria   Method of Notification Phone   Request Evaluate - Remote   Notification Reason Other (Comment)   ALDAIR Victoria called to tell this RN she is ordering a 250cc IV fluid bolus.

## 2018-08-13 NOTE — PROGRESS NOTES
Labor progress note    S: Pt coping well with labor with continuous labor support from FOB, , and Mother in law.  Is agreeable to SVE and consents to AROM if clinically indicated after risk/benefit.  No HA, visual changes, RUQ or epigastric pain. Tolerating regular diet.     O:  Blood pressure (!) 144/97, temperature 98.4  F (36.9  C), temperature source Oral, resp. rate 18.  General appearance: uncomfortable with contractions but coping.   Contractions: Every 2-5 minutes. 60-80 seconds duration.  Palpate: moderate.  Soft resting tone.   FHT: Baseline 145 with moderate variability, periods of minimal. Accelerations not present. no decelerations present.  ROM: AROM for small clear fluid.   PELVIC EXAM:6/ 80%/ Mid/ soft/ -1.  Moderate bloody how.     Pitocin- none,  Antibiotics- none  IV saline lock    Results for orders placed or performed during the hospital encounter of 08/13/18   CBC with platelets differential   Result Value Ref Range    WBC 14.8 (H) 4.0 - 11.0 10e9/L    RBC Count 4.45 3.8 - 5.2 10e12/L    Hemoglobin 12.0 11.7 - 15.7 g/dL    Hematocrit 35.8 35.0 - 47.0 %    MCV 80 78 - 100 fl    MCH 27.0 26.5 - 33.0 pg    MCHC 33.5 31.5 - 36.5 g/dL    RDW 14.1 10.0 - 15.0 %    Platelet Count 145 (L) 150 - 450 10e9/L    Diff Method Automated Method     % Neutrophils 81.4 %    % Lymphocytes 11.8 %    % Monocytes 6.2 %    % Eosinophils 0.1 %    % Basophils 0.1 %    % Immature Granulocytes 0.4 %    Nucleated RBCs 0 0 /100    Absolute Neutrophil 12.0 (H) 1.6 - 8.3 10e9/L    Absolute Lymphocytes 1.8 0.8 - 5.3 10e9/L    Absolute Monocytes 0.9 0.0 - 1.3 10e9/L    Absolute Eosinophils 0.0 0.0 - 0.7 10e9/L    Absolute Basophils 0.0 0.0 - 0.2 10e9/L    Abs Immature Granulocytes 0.1 0 - 0.4 10e9/L    Absolute Nucleated RBC 0.1    AST   Result Value Ref Range    AST 39 0 - 45 U/L   ALT   Result Value Ref Range    ALT 39 0 - 50 U/L   Uric acid   Result Value Ref Range    Uric Acid 7.6 (H) 2.6 - 6.0 mg/dL   Basic metabolic  panel   Result Value Ref Range    Sodium 139 133 - 144 mmol/L    Potassium 4.2 3.4 - 5.3 mmol/L    Chloride 106 94 - 109 mmol/L    Carbon Dioxide 20 20 - 32 mmol/L    Anion Gap 13 3 - 14 mmol/L    Glucose 86 70 - 99 mg/dL    Urea Nitrogen 13 7 - 30 mg/dL    Creatinine 1.10 (H) 0.52 - 1.04 mg/dL    GFR Estimate 60 (L) >60 mL/min/1.7m2    GFR Estimate If Black 73 >60 mL/min/1.7m2    Calcium 9.1 8.5 - 10.1 mg/dL   Protein  random urine with Creat Ratio   Result Value Ref Range    Protein Random Urine 0.10 g/L    Protein Total Urine g/gr Creatinine 0.15 0 - 0.2 g/g Cr   Creatinine urine calculation only   Result Value Ref Range    Creatinine Urine 62 mg/dL   ABO/Rh type and screen   Result Value Ref Range    ABO B     RH(D) Pos     Antibody Screen Neg     Test Valid Only At          [facility]    Specimen Expires 2018    Group B strep PCR   Result Value Ref Range    Group B Strep PCR neg    Group B strep PCR   Result Value Ref Range    Group B Strep PCR neg    Group B strep PCR   Result Value Ref Range    Group B Strep PCR neg              # Pain Assessment:    - Sherlyn is experiencing pain due to labor. Pain management was discussed with Sherlyn and her significant other, nova and mother in alw and the plan was created in a collaborative fashion.  Sherlyn's response to the current recommendations: engaged  - Non-pharmacologic adjuvants: Heat, Massage and Meditation and positioning.  - Briefly reviewed Nitrous Oxide per partner's request as pt was considering use at [facility]. Pt declines need at this time.         A:  27 year old  with IUP @ 40w4d Pre Eclampsia without severe fetaures  - active labor and minimal/no progress   - AROM <18 hours, clear, afebrile  Fetal Heart Rate Tracing category two  GBS- negative  Gestational Thrombocytopenia  Transfer from [facility]    Patient Active Problem List   Diagnosis     Encounter for triage in  pregnant patient     Hypertension in pregnancy         P:  - Ambulation, hydration, position changes, birthing ball/sling and tub options to facilitate labor.  Pt declines CNM continuous labor support as she has good support from  and FOB.   - Reviewed with pt and support persons GHTN vs Pre E diagnosis based on lab work - just borderline on labs.  IV saline lock in place.  If sustained severe range will discuss MD consult/Magnesium Sulfate prn  - Pain medication options reviewed with pt. Pt is interested in non pharmacologic options at present.   - Plan recheck SVE 4-6 hours and consider Pitocin augmentation prn if minimal progress.   - Continue close maternal surveillance for worsening s/s of Pre E.  Consult MD Carlos cardozon who is in house and consider operative delivery prn if remote from delivery  - Continue intrauterine resuscitation efforts and consult MD prn if worsening or persistent category 2 despite interventions. Consider operative delivery prn if remote from delivery.   - Reevaluate in 2 hours and prn    Ernestina MCCRARY, CRYSTAL

## 2018-08-13 NOTE — PROVIDER NOTIFICATION
08/13/18 1243   Provider Notification   Provider Name/Title ALDAIR Victoria   Method of Notification At Bedside   Request Evaluate in Person   Notification Reason Status Update  (ultrasound to verify position)   Direct OP

## 2018-08-13 NOTE — PLAN OF CARE
Problem: Patient Care Overview  Goal: Plan of Care/Patient Progress Review  Outcome: No Change  Patient transferred from birth center and care was assumed by this RN at 0715. Blood pressures 130s-150s/80s-90s. Afebrile throughout day. Denies headache, RUQ epigastric pain, visual changes. Most of the day baby had FHR variability less than 5 BPM. Intent on laboring without pain medication; decided on fentanyl at 1300; epidural at 1415. Voided independently at 1415.  Epidural placed and very effective pain relief at 1430. Plans to rest, orally hydrate. ,  and mother in law at bedside. Bedside report given to ROCIO Nixon.

## 2018-08-13 NOTE — PROGRESS NOTES
Labor progress note    S: Pt getting very uncomfortable after waking from Fentanyl rest.  Requesting more fentanyl or epidural.  Discussed at length not clinically appropriate for additional IV Fentanyl dose d/t FHR tracing.  Pt desires epidural.      O:  Blood pressure 136/87, temperature 98.4  F (36.9  C), temperature source Oral, resp. rate 18, SpO2 100 %.  General appearance: uncomfortable with contractions.  Contractions: Every 2-6 minutes. 60-90 seconds duration.  Palpate: moderate.  Soft resting tone.   FHT: Baseline 140 with moderate variability, periods of minimal. Accelerations are present. no decelerations present.  ROM: clear fluid. Membranes have been ruptured for 5 hours.  PELVIC EXAM:deferred      Pitocin- none,  Antibiotics- none  IV at maintenance rate        # Pain Assessment:    - Sherlyn is experiencing pain due to labor. Pain management was discussed with Sherlyn and her significant other. Mother in law and  and the plan was created in a collaborative fashion.  Sherlyn's response to the current recommendations: engaged  - Pt desires epidural.         A:  27 year old  with IUP @ 40w4d Pre Eclampsia without severe features  AROM<18 hours, afebrile, clear  OP presentation   Fetal Heart Rate Tracing category two  GBS- negative  Gestational Thrombocytopenia  Transfer from [facility]    Patient Active Problem List   Diagnosis     Encounter for triage in pregnant patient     Hypertension in pregnancy         P:  Ambulation, hydration, position changes, birthing ball/sling and tub options to facilitate labor.  Pain medication options reviewed with pt. Pt is interested in epidural.  Provider to Provider report given to anesthesia.   Observation and reevaluate in 1 hours prn  Recheck SVE at 4 hours s/p last check and plan labor augmentation with Pitocin   MD consultant on call Carlos reviewed FHR tracing, agrees with current plan but  Low threshold prn if worsening category 2  FHR tracing despite interventions.       Ernestina MCCRARY, JENNIFERM

## 2018-08-14 ENCOUNTER — SURGERY (OUTPATIENT)
Age: 28
End: 2018-08-14

## 2018-08-14 ENCOUNTER — ANESTHESIA EVENT (OUTPATIENT)
Dept: OBGYN | Facility: CLINIC | Age: 28
End: 2018-08-14
Payer: COMMERCIAL

## 2018-08-14 ENCOUNTER — ANESTHESIA (OUTPATIENT)
Dept: OBGYN | Facility: CLINIC | Age: 28
End: 2018-08-14
Payer: COMMERCIAL

## 2018-08-14 PROBLEM — Z98.891 S/P CESAREAN SECTION: Status: ACTIVE | Noted: 2018-08-14

## 2018-08-14 LAB
ALBUMIN UR-MCNC: NEGATIVE MG/DL
ALT SERPL W P-5'-P-CCNC: 33 U/L (ref 0–50)
ALT SERPL W P-5'-P-CCNC: 38 U/L (ref 0–50)
APPEARANCE UR: CLEAR
AST SERPL W P-5'-P-CCNC: 45 U/L (ref 0–45)
AST SERPL W P-5'-P-CCNC: 52 U/L (ref 0–45)
BACTERIA #/AREA URNS HPF: ABNORMAL /HPF
BILIRUB UR QL STRIP: NEGATIVE
COLOR UR AUTO: ABNORMAL
CREAT SERPL-MCNC: 1.41 MG/DL (ref 0.52–1.04)
CREAT SERPL-MCNC: 1.44 MG/DL (ref 0.52–1.04)
CREAT UR-MCNC: 47 MG/DL
ERYTHROCYTE [DISTWIDTH] IN BLOOD BY AUTOMATED COUNT: 14.4 % (ref 10–15)
ERYTHROCYTE [DISTWIDTH] IN BLOOD BY AUTOMATED COUNT: 14.4 % (ref 10–15)
GFR SERPL CREATININE-BSD FRML MDRD: 44 ML/MIN/1.7M2
GFR SERPL CREATININE-BSD FRML MDRD: 45 ML/MIN/1.7M2
GLUCOSE UR STRIP-MCNC: NEGATIVE MG/DL
HCT VFR BLD AUTO: 23.8 % (ref 35–47)
HCT VFR BLD AUTO: 24.2 % (ref 35–47)
HGB BLD-MCNC: 8 G/DL (ref 11.7–15.7)
HGB BLD-MCNC: 8.1 G/DL (ref 11.7–15.7)
HGB UR QL STRIP: NEGATIVE
HYALINE CASTS #/AREA URNS LPF: 2 /LPF (ref 0–2)
KETONES UR STRIP-MCNC: NEGATIVE MG/DL
LACTATE BLD-SCNC: 4.1 MMOL/L (ref 0.7–2)
LEUKOCYTE ESTERASE UR QL STRIP: NEGATIVE
MAGNESIUM SERPL-MCNC: 7.6 MG/DL (ref 1.6–2.3)
MCH RBC QN AUTO: 26.8 PG (ref 26.5–33)
MCH RBC QN AUTO: 27 PG (ref 26.5–33)
MCHC RBC AUTO-ENTMCNC: 33.5 G/DL (ref 31.5–36.5)
MCHC RBC AUTO-ENTMCNC: 33.6 G/DL (ref 31.5–36.5)
MCV RBC AUTO: 80 FL (ref 78–100)
MCV RBC AUTO: 80 FL (ref 78–100)
NITRATE UR QL: NEGATIVE
PH UR STRIP: 5 PH (ref 5–7)
PLATELET # BLD AUTO: 113 10E9/L (ref 150–450)
PLATELET # BLD AUTO: 122 10E9/L (ref 150–450)
RBC # BLD AUTO: 2.96 10E12/L (ref 3.8–5.2)
RBC # BLD AUTO: 3.02 10E12/L (ref 3.8–5.2)
RBC #/AREA URNS AUTO: 1 /HPF (ref 0–2)
SOURCE: ABNORMAL
SP GR UR STRIP: 1 (ref 1–1.03)
UROBILINOGEN UR STRIP-MCNC: NORMAL MG/DL (ref 0–2)
WBC # BLD AUTO: 24.7 10E9/L (ref 4–11)
WBC # BLD AUTO: 26.5 10E9/L (ref 4–11)
WBC #/AREA URNS AUTO: <1 /HPF (ref 0–5)

## 2018-08-14 PROCEDURE — 25000128 H RX IP 250 OP 636: Performed by: ANESTHESIOLOGY

## 2018-08-14 PROCEDURE — 87086 URINE CULTURE/COLONY COUNT: CPT | Performed by: STUDENT IN AN ORGANIZED HEALTH CARE EDUCATION/TRAINING PROGRAM

## 2018-08-14 PROCEDURE — 85027 COMPLETE CBC AUTOMATED: CPT | Performed by: STUDENT IN AN ORGANIZED HEALTH CARE EDUCATION/TRAINING PROGRAM

## 2018-08-14 PROCEDURE — 37000009 ZZH ANESTHESIA TECHNICAL FEE, EACH ADDTL 15 MIN: Performed by: OBSTETRICS & GYNECOLOGY

## 2018-08-14 PROCEDURE — C9290 INJ, BUPIVACAINE LIPOSOME: HCPCS | Performed by: ANESTHESIOLOGY

## 2018-08-14 PROCEDURE — 84450 TRANSFERASE (AST) (SGOT): CPT | Performed by: STUDENT IN AN ORGANIZED HEALTH CARE EDUCATION/TRAINING PROGRAM

## 2018-08-14 PROCEDURE — 25000132 ZZH RX MED GY IP 250 OP 250 PS 637: Performed by: STUDENT IN AN ORGANIZED HEALTH CARE EDUCATION/TRAINING PROGRAM

## 2018-08-14 PROCEDURE — 71000015 ZZH RECOVERY PHASE 1 LEVEL 2 EA ADDTL HR: Performed by: OBSTETRICS & GYNECOLOGY

## 2018-08-14 PROCEDURE — 27110028 ZZH OR GENERAL SUPPLY NON-STERILE: Performed by: OBSTETRICS & GYNECOLOGY

## 2018-08-14 PROCEDURE — 25000128 H RX IP 250 OP 636: Performed by: NURSE ANESTHETIST, CERTIFIED REGISTERED

## 2018-08-14 PROCEDURE — 83735 ASSAY OF MAGNESIUM: CPT | Performed by: STUDENT IN AN ORGANIZED HEALTH CARE EDUCATION/TRAINING PROGRAM

## 2018-08-14 PROCEDURE — 37000008 ZZH ANESTHESIA TECHNICAL FEE, 1ST 30 MIN: Performed by: OBSTETRICS & GYNECOLOGY

## 2018-08-14 PROCEDURE — 12000032 ZZH R&B OB CRITICAL UMMC

## 2018-08-14 PROCEDURE — 84460 ALANINE AMINO (ALT) (SGPT): CPT | Performed by: STUDENT IN AN ORGANIZED HEALTH CARE EDUCATION/TRAINING PROGRAM

## 2018-08-14 PROCEDURE — 25000128 H RX IP 250 OP 636: Performed by: STUDENT IN AN ORGANIZED HEALTH CARE EDUCATION/TRAINING PROGRAM

## 2018-08-14 PROCEDURE — 36000057 ZZH SURGERY LEVEL 3 1ST 30 MIN - UMMC: Performed by: OBSTETRICS & GYNECOLOGY

## 2018-08-14 PROCEDURE — 40000170 ZZH STATISTIC PRE-PROCEDURE ASSESSMENT II: Performed by: OBSTETRICS & GYNECOLOGY

## 2018-08-14 PROCEDURE — 25000125 ZZHC RX 250: Performed by: ADVANCED PRACTICE MIDWIFE

## 2018-08-14 PROCEDURE — 36000059 ZZH SURGERY LEVEL 3 EA 15 ADDTL MIN UMMC: Performed by: OBSTETRICS & GYNECOLOGY

## 2018-08-14 PROCEDURE — 27210794 ZZH OR GENERAL SUPPLY STERILE: Performed by: OBSTETRICS & GYNECOLOGY

## 2018-08-14 PROCEDURE — 82565 ASSAY OF CREATININE: CPT | Performed by: STUDENT IN AN ORGANIZED HEALTH CARE EDUCATION/TRAINING PROGRAM

## 2018-08-14 PROCEDURE — 83605 ASSAY OF LACTIC ACID: CPT | Performed by: STUDENT IN AN ORGANIZED HEALTH CARE EDUCATION/TRAINING PROGRAM

## 2018-08-14 PROCEDURE — 81001 URINALYSIS AUTO W/SCOPE: CPT | Performed by: STUDENT IN AN ORGANIZED HEALTH CARE EDUCATION/TRAINING PROGRAM

## 2018-08-14 PROCEDURE — 71000014 ZZH RECOVERY PHASE 1 LEVEL 2 FIRST HR: Performed by: OBSTETRICS & GYNECOLOGY

## 2018-08-14 PROCEDURE — 25000125 ZZHC RX 250: Performed by: NURSE ANESTHETIST, CERTIFIED REGISTERED

## 2018-08-14 PROCEDURE — 40000977 ZZH STATISTIC ATTENDANCE AT DELIVERY

## 2018-08-14 PROCEDURE — 40000275 ZZH STATISTIC RCP TIME EA 10 MIN

## 2018-08-14 PROCEDURE — 25000125 ZZHC RX 250: Performed by: ANESTHESIOLOGY

## 2018-08-14 PROCEDURE — 36415 COLL VENOUS BLD VENIPUNCTURE: CPT | Performed by: STUDENT IN AN ORGANIZED HEALTH CARE EDUCATION/TRAINING PROGRAM

## 2018-08-14 PROCEDURE — 25000128 H RX IP 250 OP 636: Performed by: ADVANCED PRACTICE MIDWIFE

## 2018-08-14 RX ORDER — SODIUM CHLORIDE, SODIUM LACTATE, POTASSIUM CHLORIDE, CALCIUM CHLORIDE 600; 310; 30; 20 MG/100ML; MG/100ML; MG/100ML; MG/100ML
INJECTION, SOLUTION INTRAVENOUS CONTINUOUS
Status: CANCELLED | OUTPATIENT
Start: 2018-08-14

## 2018-08-14 RX ORDER — BUPIVACAINE HYDROCHLORIDE 7.5 MG/ML
INJECTION, SOLUTION INTRASPINAL PRN
Status: DISCONTINUED | OUTPATIENT
Start: 2018-08-14 | End: 2018-08-14

## 2018-08-14 RX ORDER — DIPHENHYDRAMINE HCL 25 MG
25 CAPSULE ORAL EVERY 6 HOURS PRN
Status: DISCONTINUED | OUTPATIENT
Start: 2018-08-14 | End: 2018-08-16 | Stop reason: HOSPADM

## 2018-08-14 RX ORDER — ACETAMINOPHEN 325 MG/1
975 TABLET ORAL EVERY 8 HOURS
Status: DISCONTINUED | OUTPATIENT
Start: 2018-08-14 | End: 2018-08-16 | Stop reason: HOSPADM

## 2018-08-14 RX ORDER — ACETAMINOPHEN 325 MG/1
650 TABLET ORAL EVERY 4 HOURS PRN
Status: DISCONTINUED | OUTPATIENT
Start: 2018-08-17 | End: 2018-08-16 | Stop reason: HOSPADM

## 2018-08-14 RX ORDER — HYDROCORTISONE 2.5 %
CREAM (GRAM) TOPICAL 3 TIMES DAILY PRN
Status: DISCONTINUED | OUTPATIENT
Start: 2018-08-14 | End: 2018-08-16 | Stop reason: HOSPADM

## 2018-08-14 RX ORDER — OXYTOCIN/0.9 % SODIUM CHLORIDE 30/500 ML
PLASTIC BAG, INJECTION (ML) INTRAVENOUS CONTINUOUS PRN
Status: DISCONTINUED | OUTPATIENT
Start: 2018-08-14 | End: 2018-08-14

## 2018-08-14 RX ORDER — OXYTOCIN/0.9 % SODIUM CHLORIDE 30/500 ML
340 PLASTIC BAG, INJECTION (ML) INTRAVENOUS CONTINUOUS PRN
Status: DISCONTINUED | OUTPATIENT
Start: 2018-08-14 | End: 2018-08-16 | Stop reason: HOSPADM

## 2018-08-14 RX ORDER — CEFAZOLIN SODIUM 1 G/3ML
1 INJECTION, POWDER, FOR SOLUTION INTRAMUSCULAR; INTRAVENOUS SEE ADMIN INSTRUCTIONS
Status: CANCELLED | OUTPATIENT
Start: 2018-08-14

## 2018-08-14 RX ORDER — NALOXONE HYDROCHLORIDE 0.4 MG/ML
.1-.4 INJECTION, SOLUTION INTRAMUSCULAR; INTRAVENOUS; SUBCUTANEOUS
Status: DISCONTINUED | OUTPATIENT
Start: 2018-08-14 | End: 2018-08-16 | Stop reason: HOSPADM

## 2018-08-14 RX ORDER — LIDOCAINE 40 MG/G
CREAM TOPICAL
Status: DISCONTINUED | OUTPATIENT
Start: 2018-08-14 | End: 2018-08-16 | Stop reason: HOSPADM

## 2018-08-14 RX ORDER — AMOXICILLIN 250 MG
2 CAPSULE ORAL 2 TIMES DAILY PRN
Status: DISCONTINUED | OUTPATIENT
Start: 2018-08-14 | End: 2018-08-16 | Stop reason: HOSPADM

## 2018-08-14 RX ORDER — BUPIVACAINE HYDROCHLORIDE 2.5 MG/ML
INJECTION, SOLUTION EPIDURAL; INFILTRATION; INTRACAUDAL PRN
Status: DISCONTINUED | OUTPATIENT
Start: 2018-08-14 | End: 2018-08-14

## 2018-08-14 RX ORDER — SIMETHICONE 80 MG
80 TABLET,CHEWABLE ORAL 4 TIMES DAILY PRN
Status: DISCONTINUED | OUTPATIENT
Start: 2018-08-14 | End: 2018-08-16 | Stop reason: HOSPADM

## 2018-08-14 RX ORDER — SODIUM CHLORIDE, SODIUM LACTATE, POTASSIUM CHLORIDE, CALCIUM CHLORIDE 600; 310; 30; 20 MG/100ML; MG/100ML; MG/100ML; MG/100ML
INJECTION, SOLUTION INTRAVENOUS CONTINUOUS PRN
Status: DISCONTINUED | OUTPATIENT
Start: 2018-08-14 | End: 2018-08-14

## 2018-08-14 RX ORDER — SODIUM CHLORIDE, SODIUM LACTATE, POTASSIUM CHLORIDE, CALCIUM CHLORIDE 600; 310; 30; 20 MG/100ML; MG/100ML; MG/100ML; MG/100ML
INJECTION, SOLUTION INTRAVENOUS CONTINUOUS
Status: DISCONTINUED | OUTPATIENT
Start: 2018-08-14 | End: 2018-08-14 | Stop reason: HOSPADM

## 2018-08-14 RX ORDER — DEXTROSE, SODIUM CHLORIDE, SODIUM LACTATE, POTASSIUM CHLORIDE, AND CALCIUM CHLORIDE 5; .6; .31; .03; .02 G/100ML; G/100ML; G/100ML; G/100ML; G/100ML
INJECTION, SOLUTION INTRAVENOUS CONTINUOUS
Status: DISCONTINUED | OUTPATIENT
Start: 2018-08-14 | End: 2018-08-16 | Stop reason: HOSPADM

## 2018-08-14 RX ORDER — BISACODYL 10 MG
10 SUPPOSITORY, RECTAL RECTAL DAILY PRN
Status: DISCONTINUED | OUTPATIENT
Start: 2018-08-16 | End: 2018-08-16 | Stop reason: HOSPADM

## 2018-08-14 RX ORDER — MORPHINE SULFATE 1 MG/ML
INJECTION, SOLUTION EPIDURAL; INTRATHECAL; INTRAVENOUS PRN
Status: DISCONTINUED | OUTPATIENT
Start: 2018-08-14 | End: 2018-08-14

## 2018-08-14 RX ORDER — ACETAMINOPHEN 325 MG/1
975 TABLET ORAL ONCE
Status: CANCELLED | OUTPATIENT
Start: 2018-08-14 | End: 2018-08-14

## 2018-08-14 RX ORDER — CARBOPROST TROMETHAMINE 250 UG/ML
INJECTION, SOLUTION INTRAMUSCULAR PRN
Status: DISCONTINUED | OUTPATIENT
Start: 2018-08-14 | End: 2018-08-14

## 2018-08-14 RX ORDER — CEFAZOLIN SODIUM 2 G/100ML
2 INJECTION, SOLUTION INTRAVENOUS
Status: CANCELLED | OUTPATIENT
Start: 2018-08-14

## 2018-08-14 RX ORDER — FENTANYL CITRATE 50 UG/ML
INJECTION, SOLUTION INTRAMUSCULAR; INTRAVENOUS PRN
Status: DISCONTINUED | OUTPATIENT
Start: 2018-08-14 | End: 2018-08-14

## 2018-08-14 RX ORDER — MISOPROSTOL 200 UG/1
400 TABLET ORAL
Status: DISCONTINUED | OUTPATIENT
Start: 2018-08-14 | End: 2018-08-16 | Stop reason: HOSPADM

## 2018-08-14 RX ORDER — DIPHENHYDRAMINE HYDROCHLORIDE 50 MG/ML
25 INJECTION INTRAMUSCULAR; INTRAVENOUS EVERY 6 HOURS PRN
Status: DISCONTINUED | OUTPATIENT
Start: 2018-08-14 | End: 2018-08-16 | Stop reason: HOSPADM

## 2018-08-14 RX ORDER — ONDANSETRON 2 MG/ML
4 INJECTION INTRAMUSCULAR; INTRAVENOUS EVERY 30 MIN PRN
Status: DISCONTINUED | OUTPATIENT
Start: 2018-08-14 | End: 2018-08-14 | Stop reason: HOSPADM

## 2018-08-14 RX ORDER — FENTANYL CITRATE 50 UG/ML
25-50 INJECTION, SOLUTION INTRAMUSCULAR; INTRAVENOUS
Status: DISCONTINUED | OUTPATIENT
Start: 2018-08-14 | End: 2018-08-14 | Stop reason: HOSPADM

## 2018-08-14 RX ORDER — ONDANSETRON 4 MG/1
4 TABLET, ORALLY DISINTEGRATING ORAL EVERY 30 MIN PRN
Status: DISCONTINUED | OUTPATIENT
Start: 2018-08-14 | End: 2018-08-14 | Stop reason: HOSPADM

## 2018-08-14 RX ORDER — ONDANSETRON 2 MG/ML
4 INJECTION INTRAMUSCULAR; INTRAVENOUS EVERY 6 HOURS PRN
Status: DISCONTINUED | OUTPATIENT
Start: 2018-08-14 | End: 2018-08-16 | Stop reason: HOSPADM

## 2018-08-14 RX ORDER — AMOXICILLIN 250 MG
1 CAPSULE ORAL 2 TIMES DAILY PRN
Status: DISCONTINUED | OUTPATIENT
Start: 2018-08-14 | End: 2018-08-16 | Stop reason: HOSPADM

## 2018-08-14 RX ORDER — OXYTOCIN 10 [USP'U]/ML
10 INJECTION, SOLUTION INTRAMUSCULAR; INTRAVENOUS
Status: DISCONTINUED | OUTPATIENT
Start: 2018-08-14 | End: 2018-08-16 | Stop reason: HOSPADM

## 2018-08-14 RX ORDER — NALOXONE HYDROCHLORIDE 0.4 MG/ML
.1-.4 INJECTION, SOLUTION INTRAMUSCULAR; INTRAVENOUS; SUBCUTANEOUS
Status: ACTIVE | OUTPATIENT
Start: 2018-08-14 | End: 2018-08-15

## 2018-08-14 RX ORDER — CITRIC ACID/SODIUM CITRATE 334-500MG
30 SOLUTION, ORAL ORAL
Status: CANCELLED | OUTPATIENT
Start: 2018-08-14

## 2018-08-14 RX ORDER — LANOLIN 100 %
OINTMENT (GRAM) TOPICAL
Status: DISCONTINUED | OUTPATIENT
Start: 2018-08-14 | End: 2018-08-16 | Stop reason: HOSPADM

## 2018-08-14 RX ORDER — ONDANSETRON 2 MG/ML
INJECTION INTRAMUSCULAR; INTRAVENOUS PRN
Status: DISCONTINUED | OUTPATIENT
Start: 2018-08-14 | End: 2018-08-14

## 2018-08-14 RX ORDER — BUPIVACAINE HYDROCHLORIDE 7.5 MG/ML
INJECTION, SOLUTION INTRASPINAL
Status: DISCONTINUED
Start: 2018-08-14 | End: 2018-08-14 | Stop reason: HOSPADM

## 2018-08-14 RX ORDER — CEFAZOLIN SODIUM 1 G/3ML
INJECTION, POWDER, FOR SOLUTION INTRAMUSCULAR; INTRAVENOUS PRN
Status: DISCONTINUED | OUTPATIENT
Start: 2018-08-14 | End: 2018-08-14

## 2018-08-14 RX ORDER — OXYTOCIN/0.9 % SODIUM CHLORIDE 30/500 ML
100 PLASTIC BAG, INJECTION (ML) INTRAVENOUS CONTINUOUS
Status: DISCONTINUED | OUTPATIENT
Start: 2018-08-14 | End: 2018-08-16 | Stop reason: HOSPADM

## 2018-08-14 RX ORDER — OXYCODONE HYDROCHLORIDE 5 MG/1
5-10 TABLET ORAL
Status: DISCONTINUED | OUTPATIENT
Start: 2018-08-14 | End: 2018-08-16 | Stop reason: HOSPADM

## 2018-08-14 RX ADMIN — PHENYLEPHRINE HYDROCHLORIDE 50 MCG: 10 INJECTION, SOLUTION INTRAMUSCULAR; INTRAVENOUS; SUBCUTANEOUS at 02:35

## 2018-08-14 RX ADMIN — PHENYLEPHRINE HYDROCHLORIDE 0.5 MCG/KG/MIN: 10 INJECTION, SOLUTION INTRAMUSCULAR; INTRAVENOUS; SUBCUTANEOUS at 02:08

## 2018-08-14 RX ADMIN — BUPIVACAINE 20 ML: 13.3 INJECTION, SUSPENSION, LIPOSOMAL INFILTRATION at 03:50

## 2018-08-14 RX ADMIN — PHENYLEPHRINE HYDROCHLORIDE 100 MCG: 10 INJECTION, SOLUTION INTRAMUSCULAR; INTRAVENOUS; SUBCUTANEOUS at 02:08

## 2018-08-14 RX ADMIN — Medication 100 ML/HR: at 04:24

## 2018-08-14 RX ADMIN — SENNOSIDES AND DOCUSATE SODIUM 1 TABLET: 8.6; 5 TABLET ORAL at 20:28

## 2018-08-14 RX ADMIN — MAGNESIUM SULFATE IN WATER 2 G/HR: 40 INJECTION, SOLUTION INTRAVENOUS at 12:16

## 2018-08-14 RX ADMIN — SENNOSIDES AND DOCUSATE SODIUM 2 TABLET: 8.6; 5 TABLET ORAL at 10:39

## 2018-08-14 RX ADMIN — CEFAZOLIN 2 G: 1 INJECTION, POWDER, FOR SOLUTION INTRAMUSCULAR; INTRAVENOUS at 02:30

## 2018-08-14 RX ADMIN — OXYTOCIN-SODIUM CHLORIDE 0.9% IV SOLN 30 UNIT/500ML 600 ML/HR: 30-0.9/5 SOLUTION at 02:27

## 2018-08-14 RX ADMIN — SODIUM CHLORIDE, POTASSIUM CHLORIDE, SODIUM LACTATE AND CALCIUM CHLORIDE: 600; 310; 30; 20 INJECTION, SOLUTION INTRAVENOUS at 22:57

## 2018-08-14 RX ADMIN — SODIUM CHLORIDE, POTASSIUM CHLORIDE, SODIUM LACTATE AND CALCIUM CHLORIDE 75 ML/HR: 600; 310; 30; 20 INJECTION, SOLUTION INTRAVENOUS at 09:27

## 2018-08-14 RX ADMIN — CARBOPROST TROMETHAMINE 250 MCG: 250 INJECTION, SOLUTION INTRAMUSCULAR at 02:38

## 2018-08-14 RX ADMIN — ACETAMINOPHEN 975 MG: 325 TABLET, FILM COATED ORAL at 19:02

## 2018-08-14 RX ADMIN — SODIUM CHLORIDE, POTASSIUM CHLORIDE, SODIUM LACTATE AND CALCIUM CHLORIDE 125 ML/HR: 600; 310; 30; 20 INJECTION, SOLUTION INTRAVENOUS at 01:13

## 2018-08-14 RX ADMIN — SODIUM CHLORIDE, POTASSIUM CHLORIDE, SODIUM LACTATE AND CALCIUM CHLORIDE: 600; 310; 30; 20 INJECTION, SOLUTION INTRAVENOUS at 01:46

## 2018-08-14 RX ADMIN — MORPHINE SULFATE 0.15 MG: 1 INJECTION, SOLUTION EPIDURAL; INTRATHECAL; INTRAVENOUS at 02:05

## 2018-08-14 RX ADMIN — AZITHROMYCIN MONOHYDRATE 500 MG: 500 INJECTION, POWDER, LYOPHILIZED, FOR SOLUTION INTRAVENOUS at 02:00

## 2018-08-14 RX ADMIN — ACETAMINOPHEN 975 MG: 325 TABLET, FILM COATED ORAL at 10:38

## 2018-08-14 RX ADMIN — SODIUM CHLORIDE, POTASSIUM CHLORIDE, SODIUM LACTATE AND CALCIUM CHLORIDE: 600; 310; 30; 20 INJECTION, SOLUTION INTRAVENOUS at 20:29

## 2018-08-14 RX ADMIN — BUPIVACAINE HYDROCHLORIDE 20 ML: 2.5 INJECTION, SOLUTION EPIDURAL; INFILTRATION; INTRACAUDAL at 03:50

## 2018-08-14 RX ADMIN — BUPIVACAINE HYDROCHLORIDE IN DEXTROSE 1.6 ML: 7.5 INJECTION, SOLUTION SUBARACHNOID at 02:05

## 2018-08-14 RX ADMIN — FENTANYL CITRATE 15 MCG: 50 INJECTION, SOLUTION INTRAMUSCULAR; INTRAVENOUS at 02:05

## 2018-08-14 RX ADMIN — SIMETHICONE CHEW TAB 80 MG 80 MG: 80 TABLET ORAL at 10:39

## 2018-08-14 RX ADMIN — ONDANSETRON 4 MG: 2 INJECTION INTRAMUSCULAR; INTRAVENOUS at 02:15

## 2018-08-14 NOTE — ANESTHESIA PROCEDURE NOTES
Spinal/LP Procedure Note    Spinal Block  Staff:     Anesthesiologist:  MACO SHELTON  Location: OB and OR  Procedure Start/Stop Times:      8/14/2018 1:55 AM     8/14/2018 2:05 AM    patient identified, IV checked, site marked, risks and benefits discussed, informed consent, monitors and equipment checked, pre-op evaluation, at physician/surgeon's request and post-op pain management      Correct Patient: Yes      Correct Position: Yes      Correct Site: Yes      Correct Procedure: Yes      Correct Laterality:  Yes    Site Marked:  Yes  Procedure:     Procedure:  Intrathecal    ASA:  2    Position:  Sitting    Sterile Prep: chloraprep, mask and sterile gloves      Insertion site:  L3-4    Approach:  Midline    Needle Type:  Tj    Needle gauge (G):  25    Local Skin Infiltration:  2% lidocaine    amount (ml):  3    Needle Length (in):  3.5    Introducer used: Yes      Introducer gauge:  20 G    Attempts:  2    Redirects:  0    CSF:  Clear    Paresthesias:  No

## 2018-08-14 NOTE — PROGRESS NOTES
Magnesium Check  S:  Patient reports feeling well aside from dizziness. No h/a, vision change, c/p, sob, RUQ abdominal pain, or n/v. Breastfeeding baby during interview.     O:  Vitals:    18 1156 18 1225 18 1258 18 1356   BP: 116/76 115/73 104/49 115/47   Pulse:       Resp: 16 16  16   Temp:  98.8  F (37.1  C)  98.4  F (36.9  C)   TempSrc:  Oral  Oral   SpO2: 100% 100%  99%        Gen: Resting comfortably, NAD  CV: RRR, no murmurs  Resp: Non-labored breathing, CTAB  Abd: Soft, no RUQ tenderness. Very mild fundal tenderness, appropriate postop  Extrem: 2+ edema BLE, no calf tenderness  Neuro: 2+ patellar reflexes b/l, 1 beat of clonus.    UOP: 1000 mL over 2 hours    A/P:  Sherlyn Owusu is a 27 year old  at 40w5d who is PPD#0 s/p PLTCS c/b preeclampsia with severe features.     Preeclampsia w/ severe features:  - BP's normotensive, continue to monitor and treat severe range prn  - S/p Mag 4g load> 2g/hr, now running at 1.5 g/hr given elevated Cr and last Mg level of 7.6. No s/s of mag toxicity.   - HELLP labs Cr 1.1>1.35>1.44, AST 39>61>38, ALT 39>74>52, Plts 145>127>113. Will continue to trend HELLP labs q6h given worsening creatinine, next at 1800.   - Plan to discontinue magnesium at 24 hours postpartum  - Strict I&O, UOP excellent  - Repeat HELLP labs pending, down-trending appropriately.  - Continue q4h clinical mag checks, next at 1930.    Elevated WBC:  WBC 26.5 up from 16.2 yesterday. Pt afebrile, no fundal tenderness, no other signs of infection. Will get lactate with 1800 labs as well as UCx. Continue to monitor closely. Will pursue further infectious workup if she develops fever. No indication for abx at this time.     Tachycardia:  HR from 100's-120's since delivery. QBL 2200 mL Hgb went from 11.4 to 8.0. Suspect ABLA as cause, but consider EKG if continued tachycardia. Currently stable. Discussed possibility of blood transfusion tomorrow if continued  dizziness and downtrending Hgb. Pt agreeable.     Namrata Griffith MD  Ob/Gyn PGY-2  08/14/18 3:18 PM

## 2018-08-14 NOTE — PROVIDER NOTIFICATION
08/13/18 2009   Provider Notification   Provider Name/Title KALYN Hansen   Method of Notification At Bedside   Request Evaluate in Person   Notification Reason Decels     FHR had a prolonged deceleration at 1944, lasted 4min, katherine 90. Resolved after repositioning to hands&knees, starting IV fluid bolus and 15L 02 via non-rebreather mask. KALYN Hansen CNM was at the bedside along with this writer during the deceleration. Will continue to monitor.

## 2018-08-14 NOTE — PLAN OF CARE
"Problem: Patient Care Overview  Goal: Plan of Care/Patient Progress Review  Outcome: Therapy, progress toward functional goals as expected  VSS, postpartum assessments are WNL ex tachycardia throughout the day from 110s-120s. She is afebrile, reports weakness and feeling \"dizzy,\" HgB 8.0 this AM. Incision dressing is c/d/i. Voiding with palma WNL, output is very good. She is eating and drinking normally. Not passing gas yet. Denies headache, vision changes, RUQ pain, shortness of breath. Magnesium level earlier today was 7.6, per provider order Mag was turned down to 1.5 g/hr from 2g/hr and she reports feeling more comfortable (less hot and less thirsty). Reflexes are +2, no clonus. She is breastfeeding her infant independently and is in very good spirits despite her birth experience not going according to her initial plan. Her  is present and very supportive. Will continue with plan of care.       "

## 2018-08-14 NOTE — PLAN OF CARE
Problem: Patient Care Overview  Goal: Plan of Care/Patient Progress Review  Outcome: Improving  Pt ctx q2-4min, palpate strong. Pitocin infusing at 4ml/hr, titrated per orders. FHR had periods of minimal variability intermittently throughout evening. Dr. Preciado at pt bedside and recommended a  at 2235. KALYN Hansen checked pt cervix at 2243 and it was a lip/rim. Due to this the plan changed and pt began pushing at 2248. BPs were severe range and 20mg IV labetalol given at 2224 per orders. 4g loading dose of magnesium started at 2317. Report given to ROCIO Grande at bedside.

## 2018-08-14 NOTE — PROVIDER NOTIFICATION
08/14/18 1250   Provider Notification   Provider Name/Title Dr. Griffith   Method of Notification Electronic Page   Request Evaluate-Remote   Notification Reason Lab Results     Hi, her Mag level came back 7.6.

## 2018-08-14 NOTE — PROGRESS NOTES
"[facility] Labor and Delivery Progress Note    Sherlyn Owusu MRN# [id number]   Age: 27 year old YOB: 1990           Subjective:   Tired, but pushing with good effort. \"I just want to be done.\"            Objective:   Patient Vitals for the past 8 hrs:   BP Temp Temp src Resp SpO2   18 0025 144/88 99.4  F (37.4  C) Oral 18 -   18 2355 157/78 - - - -   18 2350 162/88 - - - -   18 2339 (!) 156/91 - - - -   18 2329 135/77 - - - 100 %   18 2323 157/90 - - - 90 %   18 2300 (!) 160/93 - - - 100 %   18 2251 (!) 155/95 - - - 100 %   18 2240 (!) 175/99 - - - 100 %   18 2227 (!) 158/109 - - - 100 %   18 2216 (!) 183/105 - - - 100 %   18 2201 (!) 166/93 99.8  F (37.7  C) Oral - 100 %   18 2146 162/84 - - - 100 %   18 2130 (!) 163/104 - - - 100 %   18 2100 124/80 - - - 100 %   18 139/77 - - - 100 %   18 - 98.9  F (37.2  C) Oral - -   18 1957 140/84 - - - 100 %   18 1930 134/80 - - - 100 %   18 1906 160/85 - - - 100 %   18 1830 (!) 122/103 - - - 100 %   18 1800 - 99.3  F (37.4  C) Oral - -   18 1755 (!) 151/96 - - - 100 %   18 1752 (!) 145/93 - - - 99 %        Cervical Exam: Complete and +1 station   Caput present     Membranes: Leaking clear fluid     Fetal Heart Rate:    Monitor: External US    Variability: Minimal variability. Early decelerations and late decelerations present.    Baseline Rate: 140 bpm   [place]: contractions every 1-2 minutes, lasting 60-80 seconds   Pitocin at 12mU         Assessment:   Sherlyn Johnsonormack is a 27 year old  who is 40w5d   Transfer from birth center  Pre-eclampsia with severe features   Magnesium sulfate for seizure prophylaxis   Labor dystocia, little fetal descent over two hours of pushing   Fetal Heart Rate Tracing: category 2         Plan:   Dr. Preciado consult, in room with " patient  Recommends . Discussed risks, benefits of  versus continuing.   Patient and family agree with .   Will continue to offer support as needed.   HERMANN NogeuraM

## 2018-08-14 NOTE — PROVIDER NOTIFICATION
08/14/18 1233   Provider Notification   Provider Name/Title Dr. Griffith   Method of Notification Electronic Page   Request Evaluate-Remote   Notification Reason Vital Signs Change     Her temp was 98.8, and I checked again 15 mins later just in case it was 98.6. She does feel hot though, and has been very thirsty. Hgb is now 8.0.

## 2018-08-14 NOTE — PROGRESS NOTES
[facility] Labor and Delivery Progress Note    Sherlyn Owusu MRN# [id number]   Age: 27 year old YOB: 1990           Subjective:   Pushing for past 1 hour. , , and mother in law in room for support. Continues to have relief with epidural. Denies HA, vision changes/blurred vision, or RUQ pain.            Objective:   Patient Vitals for the past 8 hrs:   BP Temp Temp src SpO2   18 2339 (!) 156/91 - - -   18 2329 135/77 - - 100 %   18 2323 157/90 - - 90 %   18 2300 (!) 160/93 - - 100 %   18 2251 (!) 155/95 - - 100 %   18 2240 (!) 175/99 - - 100 %   18 2227 (!) 158/109 - - 100 %   18 2216 (!) 183/105 - - 100 %   18 2201 (!) 166/93 99.8  F (37.7  C) Oral 100 %   18 2146 162/84 - - 100 %   18 2130 (!) 163/104 - - 100 %   18 2100 124/80 - - 100 %   18 2020 139/77 - - 100 %   18 2000 - 98.9  F (37.2  C) Oral -   18 1957 140/84 - - 100 %   18 1930 134/80 - - 100 %   18 1906 160/85 - - 100 %   18 1830 (!) 122/103 - - 100 %   18 1800 - 99.3  F (37.4  C) Oral -   18 1755 (!) 151/96 - - 100 %   18 1752 (!) 145/93 - - 99 %   18 1650 137/86 - - 99 %   18 1620 (!) 144/92 - - 100 %      Pre-eclampsia labs repeated at 2230:  AST: 74  ALT: 61  Plt: 127   Protein random urine with creatinine ratio: pending     Cervical Exam: 10 / 100/ 0     Membranes: Leaking clear fluid     Fetal Heart Rate:    Monitor: External US    Variability: Moderate. Accelerations present. Variable decelerations.    Baseline Rate: 145 bpm   [place]: contractions every 2-4 minutes, lasting  seconds. Moderate.   Pitocin at 6mU        Assessment:   Sherlyn Owusu is a 27 year old  who is 40w5d    Second stage labor - pushing x1 hour   Labor augmentation due to dysfunctional labor   Pre-eclampsia with severe features   Magnesium sulfate for seizure prophylaxis    Fetal Heart Rate Tracing: category 2   GBS negative         Plan:   Dr Preciado and Dr Davis aware of patient status  Continue to facilitate pushing efforts  Monitor labor progress and FHR closely.   Anticipate   HERMANN Noguera CNM

## 2018-08-14 NOTE — ANESTHESIA POSTPROCEDURE EVALUATION
Patient: Sherlyn Owusu    Procedure(s):   - Wound Class: II-Clean Contaminated    Diagnosis:Pregnancy  Diagnosis Additional Information: No value filed.    Anesthesia Type:  Spinal, Periph. Nerve Block for postop pain    Note:  Anesthesia Post Evaluation    Patient location during evaluation: PACU  Patient participation: Able to participate in evaluation but full recovery from regional anesthesia has not yet ocurrred but is anticipated to occur within 48 hours  Level of consciousness: awake and alert  Pain management: adequate  Airway patency: patent  Cardiovascular status: hemodynamically stable  Respiratory status: nonlabored ventilation, spontaneous ventilation and room air  Hydration status: euvolemic  PONV: none     Anesthetic complications: None          Last vitals:  Vitals:    08/14/18 0025 08/14/18 0349 08/14/18 0415   BP: 144/88 (!) (P) 102/95    Pulse:  (P) 131    Resp: 18 (P) 16    Temp: 37.4  C (99.4  F)     SpO2:  (P) 100% 100%         Electronically Signed By: Maverick Davis MD  August 14, 2018  4:45 AM

## 2018-08-14 NOTE — PROVIDER NOTIFICATION
08/14/18 1139   Provider Notification   Provider Name/Title Dr. Griffith   Method of Notification In Department   Request Evaluate in Person   Notification Reason Vital Signs Change  (tachycardia)     Spoke with Dr. Griffith in person duing rounds re: tachycardia in the 120s, lower BP, but bleeding is WNL. Urine output is WNL. Plan to recheck BP at noon and notify.

## 2018-08-14 NOTE — DISCHARGE SUMMARY
[facility] Discharge Summary    Sherlyn Owusu MRN# [id number]   Age: 27 year old YOB: 1990     Date of Admission:  2018  Date of Discharge:  18   Admitting Physician:  HERMANN Bunn CN  Discharge Physician:  Marah Antunez MD    Admit Dx:   -  at 40w4d   - Gestational Hypertension    Discharge Dx:  -  delivered at 40w5d   - PreEclampsia with severe features  - S/p PLTCS for Arrest of descent, Cat II FHT  - Acute blood loss anemia  - Leukocytosis   - REILLY    Procedures:  - Primary low transverse  section with double layer uterine closure via Pfannenstiel incision  - Spinal analgesia  - TAP block     Admit HPI:  Sherlyn Owusu is a 27 year old with Estimated Date of Delivery: Aug 9, 2018 is [facility] on 2018 at 7:56 AM as a transfer from [facility] for Hypertensive episode in pregnancy     - Pt has been in spontaneous labor on/off since Saturday. Was seen in birth center at that time and was discharged home ~1cm.  Continued to progress in early labor and was readmitted to the birth center with most recent SVE ~0245 today 18: 5-/-1 with BBOW by [facility] midwifery team.  Pt had labile BPs on/off throughout the morning with most recent /86 at 0602 before transfer.  No HA, visual changes, RUQ or epigastric pain.    Coping well with contractions q2-4 min.   Noted Latex sensitivity with pruritis.    Contractions- mild and moderate  Fetal movement- active  ROM- no   Vaginal bleeding- moderate blood show reported by [facility] midwifery team  GBS- negative  FOB- Dwain is involved, present and supporitve  Other labor support- , Pt's mother in law, and [facility] midwife     Please see her admit H&P for full details of her PMH, PSH, Meds, Allergies and exam on admit.    Operative Course:  Surgery was complicated by  uterine atony and QBL of 2234mL. Which resolved with Pitocin and hemabate. Please see her  Section Operative Note for full details regarding her delivery.    Operative Findings:    1. Clear amniotic fluid  2. Liveborn male infant in MIKAYLA presentation. Apgars 7 at 1 minute & 6 at 5 minutes, 8 at 10 minutes. Weight 3300g.  3. Cord gases: Arterial pH 7.17, base deficit 9.0, Venous 7.26, base deficit 5.5  4. Normal uterus, fallopian tubes, and ovaries. Fibrotic appearing tissue on posterior uterine wall.        Postoperative Course:  Her postoperative course was complicated preeclampsia with SF. She received 24 hours of magnesium. HELLP labs were trended. She was noted to have elevated creatinine which peaked at 1.44, FENa c/w ATN. On POD#1 HELLP labs were noted to be downtrending appropriately. Creatinine at discharge 1.22. She was also noted to have acute blood loss anemia with Hgb katherine to 7. Blood transfusion was offered, however patient declined as she was asymptomatic. On POD#0 patient was also noted to have elevated WBC of 26.5. She was afebrile without evidence of infection. UA negative. Lactate initially 4.1, improved to 1.7 with fluid bolus. WBC down-trended appropriately to 22.1 over several checks. On POD#2, she was meeting all of her postpartum goals and deemed stable for discharge. She was voiding without difficulty, tolerating a regular diet without nausea and vomiting, her pain was well controlled on oral pain medicines and her lochia was appropriate. She was ambulating without dizziness. Her hemoglobin at discharge was 7.0 and she was prescribed iron. Her Rh status was positive and Rhogam was not indicated.    # Discharge Pain Plan:  - During her hospitalization, Sherlyn experienced pain due to  section.  The pain plan for discharge was discussed with Sherlyn and the plan was created in a collaborative fashion.    - Opioids prescribed on discharge: Oxycodone 5mg q4h  - Duration of opioids  after discharge: Per CDC opioid prescribing guidelines, a 3 day prescription of opioids was provided.  - Bowel regimen: senna  - Pharmacologic adjuvants:  Acetaminophen      Discharge Medications:   Sherlyn Martinez   Home Medication Instructions JOVANNA:[id number]    Printed on:08/16/18 1055   Medication Information                      acetaminophen (TYLENOL) 325 MG tablet  Take 2 tablets (650 mg) by mouth every 4 hours as needed for mild pain or pain             ferrous sulfate (IRON) 325 (65 Fe) MG tablet  Take 1 tablet (325 mg) by mouth daily (with breakfast)             oxyCODONE IR (ROXICODONE) 5 MG tablet  Take 1-2 tablets (5-10 mg) by mouth every 3 hours as needed for other (pain control or improvement in physical function. Hold dose for analgesic side effects.)             Prenatal Vit-Fe Fumarate-FA (PRENATAL MULTIVITAMIN PLUS IRON) 27-0.8 MG TABS per tablet  Take 1 tablet by mouth daily             senna-docusate (SENOKOT-S;PERICOLACE) 8.6-50 MG per tablet  Take 2 tablets by mouth 2 times daily as needed for constipation                    Discharge/Disposition:  Sherlyn Owusu was discharged to home in stable condition with the following instructions/medications:  1) Call for temperature > 100.4, bright red vaginal bleeding >1 pad an hour x 2 hours, foul smelling vaginal discharge, pain not controlled by usual oral pain meds, persistent nausea and vomiting not controlled on medications, drainage or redness from incision site  2) She was undecided about contraception. She was counseled on her options and plans to discuss with her primary OB.  3) For feeding she decided to breastfeed.  4) She was instructed to follow-up with her primary OB in 6 weeks for a routine postpartum visit and in 1 week for BP check.  5) Discharge activity:  No heavy lifting >15 lbs or strenuous activity for 6 weeks, pelvic rest for 6 weeks, no driving or operating machinery while on  narcotics.    Namrata Griffith MD  Ob/Gyn PGY-2  08/16/18 10:51 AM      Appreciate Dr. Griffith's summary above, patient also seen and examined by me on the day of discharge. I agree with the summary above.   Marah Antunez MD

## 2018-08-14 NOTE — ANESTHESIA PREPROCEDURE EVALUATION
Anesthesia Evaluation     .             ROS/MED HX    ENT/Pulmonary:  - neg pulmonary ROS     Neurologic:       Cardiovascular:     (+) hypertension (PIH)----. : . . . :. .       METS/Exercise Tolerance:     Hematologic: Comments: Gestational thrombocytopenia - last 127 at 1030pm 8/13        Musculoskeletal:         GI/Hepatic:  - neg GI/hepatic ROS       Renal/Genitourinary:         Endo:         Psychiatric:         Infectious Disease:         Malignancy:         Other:                     Physical Exam  Normal systems: dental    Airway   Mallampati: III  TM distance: >3 FB    Dental     Cardiovascular   Rhythm and rate: regular      Pulmonary    breath sounds clear to auscultation                    Anesthesia Plan      History & Physical Review  History and physical reviewed and following examination; no interval change.    ASA Status:  2 emergent.    NPO Status:  Waived due to emergency    Plan for Spinal and Periph. Nerve Block for postop pain   PONV prophylaxis:  Ondansetron (or other 5HT-3)       Postoperative Care  Postoperative pain management:  Multi-modal analgesia, Oral pain medications, Neuraxial analgesia and Peripheral nerve block (Single Shot).      Consents  Anesthetic plan, risks, benefits and alternatives discussed with:  Patient..

## 2018-08-14 NOTE — PROGRESS NOTES
Magnesium Check  S:  Patient reports feeling well. No h/a, vision change, c/p, sob, RUQ abdominal pain, or n/v. Breastfeeding baby during interview.     O:  Vitals:    18 0800 18 0900 18 1000 18 1100   BP: 109/71 123/88 129/85 (!) 87/67   Pulse:       Resp: 16 16 16 16   Temp:  98.5  F (36.9  C)     TempSrc:  Oral     SpO2: 100% 100% 100% 100%        Gen: Resting comfortably, NAD  CV: RRR, no murmurs  Resp: Non-labored breathing, CTAB  Abd: Deferred, breastfeeding  Extrem: 2+ edema BLE, no calf tenderness  Neuro: 2+ patellar reflexes b/l, 1 beat of clonus.    UOP: 1550 mL over 2 hours    A/P:  Sherlyn Owusu is a 27 year old  at 40w5d who is PPD#0 s/p PLTCS c/b preeclampsia with severe features.     Preeclampsia w/ severe features:  - BP's normotensive, continue to monitor and treat severe range prn  - S/p Mag 4g load, now running at 2g/hr. No s/s of mag toxicity. Mg level pending given elevated creatinine  - HELLP labs Cr 1.1>1.35>1.44, AST 39>61>38, ALT 39>74>52, Plts 145>127>113. Will continue to trend HELLP labs q6h given worsening creatinine.   - Plan to discontinue magnesium at 24 hours postpartum  - Strict I&O, UOP excellent  - Repeat HELLP labs pending, down-trending appropriately.  - Continue q4h clinical mag checks, next at 1530.    Elevated WBC:  WBC 26.5 up from 16.2 yesterday. Pt afebrile, no fundal tenderness per RN, no other signs of infection. Continue to monitor closely. Will pursue infectious workup if she develops fever.     Tachycardia:  HR from 100's-120's since delivery. QBL 2200 mL Hgb went from 11.4 to 8.0. Suspect ABLA as cause, but consider EKG if continued tachycardia.     Namrata Griffith MD  Ob/Gyn PGY-2  18 11:54 AM        Pt was seen and examined by me separately from the team.  I have reviewed and agree with the above note.  She is feeling well this afternoon-anxious to get up out of bed and move around.  Denies pain, tolerating clear  liquids, no flatus yet.  She has had a brisk diuresis over the last few hours, 3.6L UOP recorded since MN.  She has been afebrile since delivery, her blood pressure has been normal with stable tacycardia in the 100-124 range.  Her abdomen is soft with fundal tenderness in proportion to her recent surgery.  Last labs at 1130-hgb 8, WBC 26.5, plts 113, Cr 1.44 (1.35), ALT 38, AST 52 (78).  Likely ABLA is the cause of her tacycardia and likely contributing to her Cr not improving yet.  Her WBC is elevated and she is at risk of infection given her long labor and unplanned c/s.  She is afebrile and feeling well currently-will continue to monitor.  Plan to continue serial labs until her Cr starts down trending-next due around 1730.  Likely her magnesium can be discontinued 24 hours after delivery around 0230 tomorrow.    Nikky Gallagher MD, FACOG

## 2018-08-14 NOTE — ANESTHESIA CARE TRANSFER NOTE
Patient: Sherlyn Owusu    Procedure(s):   - Wound Class: II-Clean Contaminated    Diagnosis: Pregnancy  Diagnosis Additional Information: No value filed.    Anesthesia Type:   Spinal, Periph. Nerve Block for postop pain     Note:  Airway :Room Air  Patient transferred to:Labor and Delivery  Handoff Report: Identifed the Patient, Identified the Reponsible Provider, Reviewed the pertinent medical history, Discussed the surgical course, Reviewed Intra-OP anesthesia mangement and issues during anesthesia, Set expectations for post-procedure period and Allowed opportunity for questions and acknowledgement of understanding      Vitals: (Last set prior to Anesthesia Care Transfer)    CRNA VITALS  8/14/2018 0302 - 8/14/2018 0345      8/14/2018             NIBP: (!)  146/131    NIBP Mean: 141                Electronically Signed By: HERMANN Winters CRNA  August 14, 2018  3:45 AM

## 2018-08-14 NOTE — PROGRESS NOTES
Has been complete and pushing x 2 hours with little to no descent.  FHT still largely category 2.      Vitals:    18 2339 18 2350 18 2355 18 0025   BP: (!) 156/91 162/88 157/78 144/88   Resp:    18   Temp:    99.4  F (37.4  C)   TempSrc:    Oral   SpO2:         Complete and 0/+1 station. Caput present.  No descent with max effort pushing  FHT: 150 with minimal variability, some late appearing decels present with ~ 50% ctx.    A/p; 26 yo  at 40+5, pre e with severe features, arrest of descent, cat 2 tracing    Recommend c/s urgently.  Discontinue  Pitocin.  Consent obtained. Discussed again surgical risks.   NICU for delivery.    Sandra Preciado MD, FACOG  [facility] Staff  OB/GYN    2018  1:29 AM

## 2018-08-14 NOTE — OP NOTE
[facility]  Full Operative Progress Note     Surgery Date:  2018    Surgeon:  Sandra Preciado MD    Assistants:  Alicia Myhre, DO, PGY-2    Kaia Grier MS3    Pre-op Diagnosis:    1. Intrauterine pregnancy at 40w5d  2. Arrest of descent   3. Preeclampsia with severe features  4. Category II FHT    Post-op Diagnosis:    1. Same   2. Liveborn male infant     Procedure:  primary low-transverse  section with two layer uterine closure via pfannenstiel incision    Anesthesia: spinal     QBL:  2234 mL    IVF:  800 mL crystalloid    UOP:  600 mL clear yellow urine at the end of the case    Drains: Black Catheter     Specimens:  Cord blood    Complications: None apparent    Indications:   Sherlyn Owusu is a 27 year old  at 40w5d was admitted as transfer of care from [facility] for elevated blood pressure. She was diagnosed with preeclampsia with severe features and received magnesium sulfate. Labor was augmented with pitocin. She progressed to complete and pushed with little descent of fetal head. FHT was category II.  section was recommended.  The risks, benefits, and alternatives of  section were discussed with the patient, and she agreed to proceed.     Findings:   1. Clear amniotic fluid  2. Liveborn male infant in MIKAYLA presentation. Apgars 7 at 1 minute & 6 at 5 minutes, 8 at 10 minutes. Weight 3300g.  3. Cord gases: Arterial pH 7.17, base deficit 9.0, Venous 7.26, base deficit 5.5  4. Normal uterus, fallopian tubes, and ovaries. Fibrotic appearing tissue on posterior uterine wall.     Complicated by Uterine atony. Patient received pitocin and Hemabate.    Procedure Details:   The patient was brought to the OR, where adequate spinal anesthesia was administered.  She was placed in the dorsal lithotomy position with a slight leftward tilt. She was prepped and draped in the usual sterile fashion. A surgical time out was performed. A  pfannenstiel skin incision was made with the scalpel, and carried down to the underlying fascia with sharp and blunt dissection. The fascia was incised in the midline, and the incision was extended laterally with the Mcnally scissors. The superior aspect of the fascia was grasped with the Kocher clamps and dissected off of the underlying rectus muscles with blunt and sharp dissection. Attention was then turned to the inferior aspect of the fascia, which was similarly dissected off of the underlying rectus muscles. The rectus muscles were  in the midline, and the peritoneum was entered bluntly, and the opening was extended with digital pressure. A transverse hysterotomy was made with the scalpel in the lower uterine segment, and the incision was extended with digital pressure. The infant was noted to be in the MIKAYLA position, a vaginal hand was used to elevate the fetal head. The infant was delivered atraumatically. The shoulders delivered easily.  No nuchal cord was noted. The cord was doubly clamped and cut after 60 seconds, and the infant was handed off to the awaiting NICU staff. A segment of cord was cut. The placenta was delivered with gentle traction on the umbilical cord and uterine massage. The uterus was exteriorized and cleared of all clots and debris. The posterior uterine wall had a region of fibrotic appearing tissue with a slippery texture. Gentle curettage was performed.  Uterine tone was noted to be firm with 30 units of pitocin given through the running IV, hemabate, and uterine massage.  The hysterotomy was closed with a running locked suture of 0 Vicryl.  The hysterotomy was then imbricated using an 0 Monocryl suture. 2 figure of x sutures were placed at the right uterine hysterotomy. The hysterotomy was noted to be hemostatic. The posterior cul-de-sac was cleared of all clots and debris. The uterus was returned to the abdomen. The pericolic gutters were cleared of all clots and debris. The  hysterotomy was reexamined and noted to be hemostatic.The fascia and rectus muscles were examined and areas of oozing were controlled with electrocautery. The fascia was closed with a running 0 Vicryl suture. The subcutaneous tissue was irrigated and areas of oozing were controlled with electrocautery. The subcutaneous tissue was less than 2 cm in thickness, and was therefore not closed. The skin was closed with 4-0 monocryl and covered with a sterile dressing.    All sponge, needle, and instrument counts were correct. The patient tolerated the procedure well, and was transferred to recovery in stable condition. Dr. Preciaod was present and scrubbed for the entirety of the procedure.     Alicia Myhre, DO  OB/GYN Resident, PGY-2  8/14/2018 1:30 AM      Staff:  I was scrubbed and present for entire case and agree w/ above note.    Sandra Preciado MD

## 2018-08-14 NOTE — PROGRESS NOTES
[facility] Labor and Delivery Progress Note    Sherlyn Owusu MRN# [id number]   Age: 27 year old YOB: 1990           Subjective:   Assumed care of patient at 1830. Resting on left side, great relief with epidural. , mother in law, and  at bedside. No HA, vision changes, or RUQ pain.          Objective:   Patient Vitals for the past 8 hrs:   BP Temp Temp src SpO2   18 139/77 - - 100 %   18 1957 140/84 - - 100 %   18 1930 134/80 - - 100 %   18 1906 160/85 - - 100 %   18 1830 (!) 122/103 - - 100 %   18 1800 - 99.3  F (37.4  C) Oral -   18 1755 (!) 151/96 - - 100 %   18 1752 (!) 145/93 - - 99 %   18 1650 137/86 - - 99 %   18 1620 (!) 144/92 - - 100 %   18 1600 - 97.8  F (36.6  C) Oral -   18 1549 136/87 - - 100 %   18 1515 145/90 - - -   18 1509 132/79 - - -   18 1504 130/79 - - -   18 1458 144/86 - - -   18 1456 138/84 - - -   18 1454 142/89 - - -   18 1452 (!) 148/91 - - -   18 1450 146/81 - - -   18 1448 149/84 - - -   18 1446 160/88 - - -   18 1444 153/82 - - -   18 1442 145/79 - - -   18 1441 154/84 - - -   18 1424 (!) 163/95 - - -   18 1400 - 98.4  F (36.9  C) Oral -   18 1257 (!) 135/94 - - -        Cervical Exam:  / -2      Position: anterior   Caput present     Membranes: Leaking clear fluid     Fetal Heart Rate:    Monitor: External US    Variability: Minimal. Periods of moderate variability.    Baseline Rate: 145 bpm   Hemlock: contractions every 4-6 minutes, lasting 120-160 seconds. Moderate to palpation.         Assessment:   Sherlyn burgess Papa is a 27 year old  who is 40w4d   Transfer of care from [facility]  Here with active labor - dysfunctional labor   Pre-eclampsia without severe features  Fetal Heart Rate Tracing: category 2   GBS negative    Gestational thrombocytopenia         Plan:   Long discussion with patient, , mother in law and  regarding fetal heart rate tracing, little progress over several hours, and fetal position.   Recommend starting pitocin for labor augmentation. IUPC to assess strength of contractions. Agreeable to plan. IUPC palced at 2015 and Pitocin initiated at 2018. Continue to monitor FHR and labor progress closely. Dr. Preciado and Dr. Griffith aware of patient status. Re-evaluate in 1-2 hours or prn.   HERMANN Noguera CNM

## 2018-08-14 NOTE — ANESTHESIA PROCEDURE NOTES
Peripheral Nerve Block Procedure Note    Staff:     Anesthesiologist:  MACO SHELTON    Referred By:  SHAYNA GONZALEZ  Location: OB and PACU  Procedure Start/Stop TImes:      8/14/2018 4:00 AM     8/14/2018 4:10 AM    patient identified, IV checked, site marked, risks and benefits discussed, informed consent, monitors and equipment checked, pre-op evaluation, at physician/surgeon's request and post-op pain management      Correct Patient: Yes      Correct Position: Yes      Correct Site: Yes      Correct Procedure: Yes      Correct Laterality:  Yes    Site Marked:  Yes  Procedure details:     Procedure:  TAP    ASA:  2    Laterality:  Bilateral    Position:  Supine    Sterile Prep: chloraprep, mask and sterile gloves      Local skin infiltration:  None    Needle:  Short bevel    Needle gauge:  21    Needle length (inches):  3.13    Ultrasound: Yes      Ultrasound used to identify targeted nerve, plexus, or vascular structure and placed a needle adjacent to it      Permanent Image entered into patiient's record      Abnormal pain on injection: No      Blood Aspirated: No      Paresthesias:  No    Bleeding at site: No      Bolus via:  Needle    Infusion Method:  Single Shot    Complications:  None  Assessment/Narrative:     Injection made incrementally with aspirations every (mL):  5

## 2018-08-14 NOTE — PROGRESS NOTES
Asked to review strip for patient who has been here after transfer from birth center for blood pressure issues.     Pt was 5-6 upon arrival.  She initially was refusing augmentation with pitocin.  Did agree at approx 8 pm.      She was changed to 8 at approx 10 PM, but the FHT had been notably cat 2.    Vitals:    18 2146 18 2201 18 2216 18 2227   BP: 162/84 (!) 166/93 (!) 183/105 (!) 158/109   Resp:       Temp:  99.8  F (37.7  C)     TempSrc:  Oral     SpO2: 100% 100% 100% 100%     FHT: 150 w/ minimal variability. There are decelerations  Present to the 140s and late in appearance.   TOCO: 2-4 min    A/p: 26 yo  at 40+4 wks with pre e by blood pressure criteria.  She has a category 2 tracing and has for over 4 hours.      Recommend c/s for delivery.  Discussed risks and benefits of surgery including bleeding, infection.  We discussed fetal risk of persistent cat 2 tracing as well and reasons for rec'd c/s.     Pt wanted time to discuss with family.     In interim, pt was rechecked and found to be completely dilated. Pt desires to attempt vaginal delivery and plans to attempt pushing now.    Sandra Preciado MD, FACOG  [facility] Staff  OB/GYN    2018  11:06 PM

## 2018-08-14 NOTE — PLAN OF CARE
Data: Sherlyn Owusu transferred to [id number] via zoomcart escort with this RN at 0530. Baby transferred via parent's arms.  Action: Receiving unit notified of transfer: Yes. Patient and family notified of room change. Belongings sent to receiving unit. Accompanied by thisRegistered Nurse. Oriented patient to surroundings. Call light within reach.  Response: Patient tolerated transfer and is stable.

## 2018-08-15 LAB
ANION GAP SERPL CALCULATED.3IONS-SCNC: 7 MMOL/L (ref 3–14)
BACTERIA SPEC CULT: NO GROWTH
BUN SERPL-MCNC: 13 MG/DL (ref 7–30)
CALCIUM SERPL-MCNC: 7.7 MG/DL (ref 8.5–10.1)
CHLORIDE SERPL-SCNC: 106 MMOL/L (ref 94–109)
CO2 SERPL-SCNC: 26 MMOL/L (ref 20–32)
CREAT SERPL-MCNC: 1.21 MG/DL (ref 0.52–1.04)
CREAT SERPL-MCNC: 1.33 MG/DL (ref 0.52–1.04)
CREAT UR-MCNC: 15 MG/DL
FRACT EXCRET NA UR+SERPL-RTO: 4.2 %
GFR SERPL CREATININE-BSD FRML MDRD: 48 ML/MIN/1.7M2
GFR SERPL CREATININE-BSD FRML MDRD: 54 ML/MIN/1.7M2
GLUCOSE SERPL-MCNC: 104 MG/DL (ref 70–99)
HGB BLD-MCNC: 7 G/DL (ref 11.7–15.7)
LACTATE BLD-SCNC: 1.7 MMOL/L (ref 0.7–2)
Lab: NORMAL
PLATELET # BLD AUTO: 129 10E9/L (ref 150–450)
POTASSIUM SERPL-SCNC: 4.7 MMOL/L (ref 3.4–5.3)
SODIUM SERPL-SCNC: 139 MMOL/L (ref 133–144)
SODIUM UR-SCNC: 66 MMOL/L
SPECIMEN SOURCE: NORMAL
WBC # BLD AUTO: 22.1 10E9/L (ref 4–11)

## 2018-08-15 PROCEDURE — 85027 COMPLETE CBC AUTOMATED: CPT | Performed by: STUDENT IN AN ORGANIZED HEALTH CARE EDUCATION/TRAINING PROGRAM

## 2018-08-15 PROCEDURE — 80048 BASIC METABOLIC PNL TOTAL CA: CPT | Performed by: STUDENT IN AN ORGANIZED HEALTH CARE EDUCATION/TRAINING PROGRAM

## 2018-08-15 PROCEDURE — 82570 ASSAY OF URINE CREATININE: CPT | Performed by: STUDENT IN AN ORGANIZED HEALTH CARE EDUCATION/TRAINING PROGRAM

## 2018-08-15 PROCEDURE — 84300 ASSAY OF URINE SODIUM: CPT | Performed by: STUDENT IN AN ORGANIZED HEALTH CARE EDUCATION/TRAINING PROGRAM

## 2018-08-15 PROCEDURE — 82565 ASSAY OF CREATININE: CPT | Performed by: STUDENT IN AN ORGANIZED HEALTH CARE EDUCATION/TRAINING PROGRAM

## 2018-08-15 PROCEDURE — 83605 ASSAY OF LACTIC ACID: CPT | Performed by: STUDENT IN AN ORGANIZED HEALTH CARE EDUCATION/TRAINING PROGRAM

## 2018-08-15 PROCEDURE — 36415 COLL VENOUS BLD VENIPUNCTURE: CPT | Performed by: STUDENT IN AN ORGANIZED HEALTH CARE EDUCATION/TRAINING PROGRAM

## 2018-08-15 PROCEDURE — 25000132 ZZH RX MED GY IP 250 OP 250 PS 637: Performed by: STUDENT IN AN ORGANIZED HEALTH CARE EDUCATION/TRAINING PROGRAM

## 2018-08-15 PROCEDURE — 12000030 ZZH R&B OB INTERMEDIATE UMMC

## 2018-08-15 RX ADMIN — OXYCODONE HYDROCHLORIDE 5 MG: 5 TABLET ORAL at 19:38

## 2018-08-15 RX ADMIN — OXYCODONE HYDROCHLORIDE 10 MG: 5 TABLET ORAL at 00:40

## 2018-08-15 RX ADMIN — OXYCODONE HYDROCHLORIDE 5 MG: 5 TABLET ORAL at 16:32

## 2018-08-15 RX ADMIN — SIMETHICONE CHEW TAB 80 MG 80 MG: 80 TABLET ORAL at 01:02

## 2018-08-15 RX ADMIN — OXYCODONE HYDROCHLORIDE 5 MG: 5 TABLET ORAL at 13:24

## 2018-08-15 RX ADMIN — SENNOSIDES AND DOCUSATE SODIUM 2 TABLET: 8.6; 5 TABLET ORAL at 19:38

## 2018-08-15 RX ADMIN — ACETAMINOPHEN 975 MG: 325 TABLET, FILM COATED ORAL at 03:12

## 2018-08-15 RX ADMIN — ACETAMINOPHEN 975 MG: 325 TABLET, FILM COATED ORAL at 19:37

## 2018-08-15 RX ADMIN — OXYCODONE HYDROCHLORIDE 5 MG: 5 TABLET ORAL at 10:32

## 2018-08-15 RX ADMIN — SIMETHICONE CHEW TAB 80 MG 80 MG: 80 TABLET ORAL at 10:32

## 2018-08-15 RX ADMIN — OXYCODONE HYDROCHLORIDE 5 MG: 5 TABLET ORAL at 07:09

## 2018-08-15 RX ADMIN — SENNOSIDES AND DOCUSATE SODIUM 2 TABLET: 8.6; 5 TABLET ORAL at 10:32

## 2018-08-15 RX ADMIN — OXYCODONE HYDROCHLORIDE 5 MG: 5 TABLET ORAL at 03:39

## 2018-08-15 RX ADMIN — ACETAMINOPHEN 975 MG: 325 TABLET, FILM COATED ORAL at 11:40

## 2018-08-15 NOTE — PLAN OF CARE
Problem: Patient Care Overview  Goal: Plan of Care/Patient Progress Review  Outcome: Improving  Palma removed this AM patient has voided twice post palma, continues to have good output. Patient has been walking around in room all day, showered. No reports of dizziness or light headedness. Tachycardic. G2 notified. Resting currently.

## 2018-08-15 NOTE — PLAN OF CARE
Problem: Postpartum ( Delivery) (Adult,Obstetrics,Pediatric)  Goal: Signs and Symptoms of Listed Potential Problems Will be Absent, Minimized or Managed (Postpartum)  Signs and symptoms of listed potential problems will be absent, minimized or managed by discharge/transition of care (reference Postpartum ( Delivery) (Adult,Obstetrics,Pediatric) CPG).   Outcome: Improving  VS WNL throughout the last 12 hours, pain managed well with tylenol and laureen with only one time break out due to shoulder strap gas pain. Given hot packs and Laureen, pain decreased within 25 minutes and gone within hour. Mylicon given for prevention of further gas pain. Up to side of bed and walked in place then sat at side of bed for 15 minutes. See flow sheets for assessments. Breast feeding going well with minimal assistance with positioning Pt able to latch baby on her own. Pt excited and happy that son is here and talked a lot about experience she and  had in getting pregnant.  2+ reflexes and zero clonus this shift. Mag shut off at 02:15. Pt asymptomatic .

## 2018-08-15 NOTE — PROGRESS NOTES
Post Partum Progress Note  POD#1  Subjective:  She is resting comfortably in bed this morning.  States that she is feeling much improved.  Pain is well controlled on current medication regimen. Tolerating PO intake.  Lochia present and similar to menses.  Black in place.  Passing flatus. Has not ambulated.  She denies fever, chills, headache, changes in vision, nausea/vomiting, chest pain, shortness of breath, RUQ pain, or worsening edema.  Plans to breast feed.    Objective:  Vitals:    18 1758 18 2040 18 2200 08/15/18 0120   BP: 126/75 128/81 126/77 126/77   Pulse:       Resp:    Temp: 98.2  F (36.8  C) 98.7  F (37.1  C)     TempSrc: Oral Oral     SpO2: 100% 100% 100% 99%       General: NAD. A&Ox3.  CV: RRR.  Pulm: CTAB. Normal respiratory effort.  Abd: Soft, non-tender, distended, tympanitic, diminished bowel sounds. Fundus is firm and below the umbilicus.   Incision c/d/i.   Ext: SCDs in place. +1 edema. No calf tenderness.    # Pain Assessment:  Current Pain Score 8/15/2018   Patient currently in pain? denies   Pain location -   Pain descriptors -   Sherlyn dos santos pain level was assessed and she currently denies pain.        Intake/Output Summary (Last 24 hours) at 08/15/18 0537  Last data filed at 08/15/18 0300   Gross per 24 hour   Intake          6534.58 ml   Output             9600 ml   Net         -3065.42 ml       Assessment/Plan:  Sherlyn Owusu is a 27 year old  female who is POD#1 s/p PLTCS for Arrest of descent. Intrapartum course complicated by preeclampsia with SF. Patient s/p magnesium sulfate. Patient with elevated WBC and lactate on POD#0.     Preeclampsia with severe features  - BP's normotensive ranging 120s/70s. Continue to monitor and treat severe range prn  - S/p Mag 4g load  - HELLP labs Cr 1.1>1.35>1.44>1.41 FENA 4.2 suggestive of ATN.   -  AST 39>61>52>45, ALT 39>74>38>33, Plts 145>127>113>122>129.   - Strict I&O, UOP excellent      Elevated  WBC:  -WBC trending down. 24.7 from 26.5 > Pending this a.m.  Patient afebrile, no chills, no chest pain, mild fundal pain due to CS. No redness or warm touch at incision site.  -Lactate 4.1>1.7  -UA negative,UC pending    - Encourage routine post-operative goals including ambulation and incentive spirometry  - PNC: Rh pos. Rubella immune. No intervention indicated.  - Pain: controlled on oral medications  - Heme: Hgb 11.4> EBL 2234>8.0>7.0.  Patient prefers to take home iron. Transfusion discussed. Patient asymptomatic of acute blood loss anemia, but has not ambulated yet.  VSS  - GI: continue anti-emetics and stool softeners as needed.  - : Palma in place. Will remove this a.m.  - Infant: Stable in room  - Feeding: Plans on breastfeeding.  - BC: Plans on abstinence. Discussed recommendations for no pregnancy for at least 1 year. Recommended some form of contraception as abstinence is not likely realistic. Patient receptive. Will discuss further    Discharge to home on POD#3-4 pending BP monitoring and post-operative goals.     Alicia Myhre, DO  OB/GYN, PGY-2    Staff MD Note    I appreciate the note by Dr. Myhre.  Any necessary changes have been made by me.  I evaluated the patient with the resident and agree with the assessment and plan.  Patient without any concerning symptoms of sepsis or infection.  Tachycardic but this also likely associated with ABLA.  Patient declines blood transfusion as states she feels fine.  Excited to be done with Magnesium and have her palma catheter taken out.  Looking forward to a shower today.  Feels she is doing much better.    Hiwot Molina MD

## 2018-08-16 VITALS
HEART RATE: 90 BPM | DIASTOLIC BLOOD PRESSURE: 85 MMHG | RESPIRATION RATE: 16 BRPM | OXYGEN SATURATION: 100 % | SYSTOLIC BLOOD PRESSURE: 135 MMHG | WEIGHT: 173.06 LBS | TEMPERATURE: 98.9 F

## 2018-08-16 PROCEDURE — 25000132 ZZH RX MED GY IP 250 OP 250 PS 637: Performed by: STUDENT IN AN ORGANIZED HEALTH CARE EDUCATION/TRAINING PROGRAM

## 2018-08-16 RX ORDER — FERROUS SULFATE 325(65) MG
325 TABLET ORAL
Qty: 90 TABLET | Refills: 2 | Status: SHIPPED | OUTPATIENT
Start: 2018-08-16

## 2018-08-16 RX ORDER — ACETAMINOPHEN 325 MG/1
650 TABLET ORAL EVERY 4 HOURS PRN
Qty: 100 TABLET | Refills: 0 | Status: SHIPPED | OUTPATIENT
Start: 2018-08-17

## 2018-08-16 RX ORDER — AMOXICILLIN 250 MG
2 CAPSULE ORAL 2 TIMES DAILY PRN
Qty: 100 TABLET | Refills: 0 | Status: ON HOLD | OUTPATIENT
Start: 2018-08-16 | End: 2022-11-21

## 2018-08-16 RX ORDER — OXYCODONE HYDROCHLORIDE 5 MG/1
5-10 TABLET ORAL
Qty: 15 TABLET | Refills: 0 | Status: ON HOLD | OUTPATIENT
Start: 2018-08-16 | End: 2022-11-21

## 2018-08-16 RX ORDER — PRENATAL VIT/IRON FUM/FOLIC AC 27MG-0.8MG
1 TABLET ORAL DAILY
Qty: 100 TABLET | Refills: 3 | Status: SHIPPED | OUTPATIENT
Start: 2018-08-16

## 2018-08-16 RX ADMIN — ACETAMINOPHEN 975 MG: 325 TABLET, FILM COATED ORAL at 05:19

## 2018-08-16 NOTE — PROGRESS NOTES
Post Partum Progress Note  POD#2  Subjective:  She is resting comfortably in bed this morning.  States that she is feeling much improved.  Pain is well controlled on current medication regimen. Tolerating PO intake.  Lochia present and similar to menses.  Voiding spontaneously  Passing flatus. Ambulated many times yesterday around the halls without dizziness or lightheadedness. She denies fever, chills, headache, changes in vision, nausea/vomiting, chest pain, shortness of breath, RUQ pain, or worsening edema.  Plans to breast feed. Would very much like to discharge today if possible.      Objective:  Vitals:    08/15/18 2000 18 0000 18 0400 18 0537   BP: 132/88 134/74 127/83    Pulse:   115    Resp:     Temp: 99.4  F (37.4  C) 98.4  F (36.9  C) 98.4  F (36.9  C)    TempSrc: Oral Oral Oral    SpO2:       Weight:    78.5 kg (173 lb 0.9 oz)       General: NAD. A&Ox3.  CV: Tachycardic, well perfused  Pulm: Normal respiratory effort.  Abd: Soft, non-tender. Fundus is firm at the umbilicus.   Incision: Steri strips in place, c/d/i.   Ext: +1 edema. No calf tenderness.     # Pain Assessment:  Current Pain Score 8/15/2018   Patient currently in pain? denies   Pain location -   Pain descriptors -   Sherlyn dos santos pain level was assessed and she currently denies pain.       UOP:  8900 mL over 24 hours, 1650 ml since midnight.   Weight: Down 2 kg from yesterday       Assessment/Plan:  Sherlyn Owusu is a 27 year old  female who is POD#2 s/p PLTCS for Arrest of descent. Intrapartum course complicated by preeclampsia with SF.      Preeclampsia with severe features  - BP's normotensive ranging 120s/70s. Continue to monitor and treat severe range prn  - S/p 24h magnesium  - HELLP labs Cr 1.1>1.35>1.44>1.41>1.21 FENA 4.2 suggestive of ATN. AST 39>61>52>45, ALT 39>74>38>33, Plts 145>127>113>122>129. Overall trending appropriately.   - Strict I&O, UOP excellent     Elevated WBC:  -WBC  trending down: 26.5 > 24.7>22.1. Patient afebrile, no chills, no chest pain, mild fundal pain due to CS. No redness or warm touch at incision site.   -Lactate 4.1>1.7  -UA negative,UC pending     Routine postpartum cares:  - Encourage routine post-operative goals including ambulation and incentive spirometry  - PNC: Rh pos. Rubella immune. No intervention indicated.  - Pain: controlled on oral medications  - Heme: Hgb 11.4> EBL 2234>8.0>7.0.  Patient prefers to take home iron. Transfusion discussed. Mild tachycardia, suspect due to ABLA. Patient o/w asymptomatic.   - GI: continue anti-emetics and stool softeners as needed.  - : s/p palma, voiding spontaneously  - Infant: Stable in room  - Feeding: Plans on breastfeeding.  - BC:  Discussed recommendations for no pregnancy for at least 1 year. Patient and  plan on using contraception, but are undecided about method. Options discussed. Plan to discuss this with primary OB.     Discharge to home on POD#2-3.    Namrata Griffith MD  Ob/Gyn PGY-2  08/16/18 6:47 AM      Appreciate Dr. Griffith's note above, patient also seen and examined by me. I agree with the note above.   Marah Antunez MD

## 2018-08-16 NOTE — PLAN OF CARE
Pt and family ready for Discharge today. Education complete. Questions answered. Fundus firm, U/1. Scant lochia. Ambulating well and frequent. Voiding without difficulty.  Vitals WNL.    Eating fine, no nausea.   Incision CDI. Steri strips intact.  Uses an abdominal binder for comfort.  Pain controlled with oral medication.  Breastfeeding well. Has support at home.    D/C meds: Will give to pt when arrives from [place] pharm  D/C class: declined. Will go over education in room  Pump: has one at home  Other concerns: none

## 2018-08-16 NOTE — PLAN OF CARE
Problem: Patient Care Overview  Goal: Plan of Care/Patient Progress Review  Outcome: Improving  Breastfeeding independently on cue. Good latch noted when baby sucking. Hand expressed colostrum onto on spoon from the right breast. Unable to obtain colostrum from left breast. Baby continues to show signs of hunger after each feed. Fundus firm and midline, U/U. Oxy and tylenol given for incisional pain, relief obtained. Vss

## 2019-02-15 ENCOUNTER — HEALTH MAINTENANCE LETTER (OUTPATIENT)
Age: 29
End: 2019-02-15

## 2020-03-11 ENCOUNTER — HEALTH MAINTENANCE LETTER (OUTPATIENT)
Age: 30
End: 2020-03-11

## 2021-01-03 ENCOUNTER — HEALTH MAINTENANCE LETTER (OUTPATIENT)
Age: 31
End: 2021-01-03

## 2021-04-25 ENCOUNTER — HEALTH MAINTENANCE LETTER (OUTPATIENT)
Age: 31
End: 2021-04-25

## 2021-10-10 ENCOUNTER — HEALTH MAINTENANCE LETTER (OUTPATIENT)
Age: 31
End: 2021-10-10

## 2022-05-21 ENCOUNTER — HEALTH MAINTENANCE LETTER (OUTPATIENT)
Age: 32
End: 2022-05-21

## 2022-09-09 ENCOUNTER — LAB REQUISITION (OUTPATIENT)
Dept: LAB | Facility: CLINIC | Age: 32
End: 2022-09-09

## 2022-09-09 DIAGNOSIS — Z3A.28 28 WEEKS GESTATION OF PREGNANCY: ICD-10-CM

## 2022-09-09 LAB
HEPATITIS B SURFACE ANTIGEN (EXTERNAL): NONREACTIVE
HEPATITIS C ANTIBODY (EXTERNAL): NEGATIVE
HIV1+2 AB SERPL QL IA: NONREACTIVE
HIV1+2 AB SERPL QL IA: NONREACTIVE
PLATELET COUNT (EXTERNAL): 266 10E3/UL (ref 150–450)
RUBELLA ANTIBODY IGG (EXTERNAL): NORMAL
TREPONEMA PALLIDUM ANTIBODY (EXTERNAL): NONREACTIVE

## 2022-09-09 PROCEDURE — 86592 SYPHILIS TEST NON-TREP QUAL: CPT | Performed by: PHYSICIAN ASSISTANT

## 2022-09-12 LAB — RPR SER QL: NONREACTIVE

## 2022-09-18 ENCOUNTER — HEALTH MAINTENANCE LETTER (OUTPATIENT)
Age: 32
End: 2022-09-18

## 2022-11-04 ENCOUNTER — LAB REQUISITION (OUTPATIENT)
Dept: LAB | Facility: CLINIC | Age: 32
End: 2022-11-04
Payer: COMMERCIAL

## 2022-11-04 DIAGNOSIS — Z36.85 ENCOUNTER FOR ANTENATAL SCREENING FOR STREPTOCOCCUS B: ICD-10-CM

## 2022-11-04 LAB — GROUP B STREPTOCOCCUS (EXTERNAL): NEGATIVE

## 2022-11-04 PROCEDURE — 87653 STREP B DNA AMP PROBE: CPT | Mod: ORL | Performed by: OBSTETRICS & GYNECOLOGY

## 2022-11-06 LAB — GP B STREP DNA SPEC QL NAA+PROBE: NEGATIVE

## 2022-11-21 ENCOUNTER — ANESTHESIA (OUTPATIENT)
Dept: OBGYN | Facility: CLINIC | Age: 32
End: 2022-11-21
Payer: COMMERCIAL

## 2022-11-21 ENCOUNTER — ANESTHESIA EVENT (OUTPATIENT)
Dept: OBGYN | Facility: CLINIC | Age: 32
End: 2022-11-21
Payer: COMMERCIAL

## 2022-11-21 ENCOUNTER — HOSPITAL ENCOUNTER (INPATIENT)
Facility: CLINIC | Age: 32
LOS: 1 days | Discharge: HOME | End: 2022-11-22
Attending: OBSTETRICS & GYNECOLOGY | Admitting: OBSTETRICS & GYNECOLOGY
Payer: COMMERCIAL

## 2022-11-21 DIAGNOSIS — O34.219 VBAC (VAGINAL BIRTH AFTER CESAREAN): Primary | ICD-10-CM

## 2022-11-21 PROBLEM — O47.9 UTERINE CONTRACTIONS: Status: ACTIVE | Noted: 2022-11-21

## 2022-11-21 LAB
ABO/RH(D): NORMAL
ANTIBODY SCREEN: NEGATIVE
ERYTHROCYTE [DISTWIDTH] IN BLOOD BY AUTOMATED COUNT: 13.4 % (ref 10–15)
HCT VFR BLD AUTO: 33.6 % (ref 35–47)
HGB BLD-MCNC: 11 G/DL (ref 11.7–15.7)
MCH RBC QN AUTO: 26.3 PG (ref 26.5–33)
MCHC RBC AUTO-ENTMCNC: 32.7 G/DL (ref 31.5–36.5)
MCV RBC AUTO: 80 FL (ref 78–100)
PLATELET # BLD AUTO: 183 10E3/UL (ref 150–450)
RBC # BLD AUTO: 4.19 10E6/UL (ref 3.8–5.2)
RUPTURE OF FETAL MEMBRANES BY ROM PLUS: NEGATIVE
SARS-COV-2 RNA RESP QL NAA+PROBE: NEGATIVE
SPECIMEN EXPIRATION DATE: NORMAL
T PALLIDUM AB SER QL: NONREACTIVE
WBC # BLD AUTO: 9.7 10E3/UL (ref 4–11)

## 2022-11-21 PROCEDURE — 86850 RBC ANTIBODY SCREEN: CPT

## 2022-11-21 PROCEDURE — 10907ZC DRAINAGE OF AMNIOTIC FLUID, THERAPEUTIC FROM PRODUCTS OF CONCEPTION, VIA NATURAL OR ARTIFICIAL OPENING: ICD-10-PCS | Performed by: OBSTETRICS & GYNECOLOGY

## 2022-11-21 PROCEDURE — 120N000001 HC R&B MED SURG/OB

## 2022-11-21 PROCEDURE — 370N000003 HC ANESTHESIA WARD SERVICE

## 2022-11-21 PROCEDURE — 86901 BLOOD TYPING SEROLOGIC RH(D): CPT

## 2022-11-21 PROCEDURE — 250N000009 HC RX 250: Performed by: ANESTHESIOLOGY

## 2022-11-21 PROCEDURE — 84112 EVAL AMNIOTIC FLUID PROTEIN: CPT | Performed by: OBSTETRICS & GYNECOLOGY

## 2022-11-21 PROCEDURE — 250N000013 HC RX MED GY IP 250 OP 250 PS 637: Performed by: OBSTETRICS & GYNECOLOGY

## 2022-11-21 PROCEDURE — 722N000001 HC LABOR CARE VAGINAL DELIVERY SINGLE

## 2022-11-21 PROCEDURE — 258N000003 HC RX IP 258 OP 636

## 2022-11-21 PROCEDURE — 36415 COLL VENOUS BLD VENIPUNCTURE: CPT

## 2022-11-21 PROCEDURE — 00HU33Z INSERTION OF INFUSION DEVICE INTO SPINAL CANAL, PERCUTANEOUS APPROACH: ICD-10-PCS | Performed by: ANESTHESIOLOGY

## 2022-11-21 PROCEDURE — 250N000011 HC RX IP 250 OP 636

## 2022-11-21 PROCEDURE — C9803 HOPD COVID-19 SPEC COLLECT: HCPCS

## 2022-11-21 PROCEDURE — 85027 COMPLETE CBC AUTOMATED: CPT

## 2022-11-21 PROCEDURE — 250N000009 HC RX 250

## 2022-11-21 PROCEDURE — 3E0R3BZ INTRODUCTION OF ANESTHETIC AGENT INTO SPINAL CANAL, PERCUTANEOUS APPROACH: ICD-10-PCS | Performed by: ANESTHESIOLOGY

## 2022-11-21 PROCEDURE — 0UQMXZZ REPAIR VULVA, EXTERNAL APPROACH: ICD-10-PCS | Performed by: OBSTETRICS & GYNECOLOGY

## 2022-11-21 PROCEDURE — U0003 INFECTIOUS AGENT DETECTION BY NUCLEIC ACID (DNA OR RNA); SEVERE ACUTE RESPIRATORY SYNDROME CORONAVIRUS 2 (SARS-COV-2) (CORONAVIRUS DISEASE [COVID-19]), AMPLIFIED PROBE TECHNIQUE, MAKING USE OF HIGH THROUGHPUT TECHNOLOGIES AS DESCRIBED BY CMS-2020-01-R: HCPCS

## 2022-11-21 PROCEDURE — 250N000011 HC RX IP 250 OP 636: Performed by: ANESTHESIOLOGY

## 2022-11-21 PROCEDURE — 258N000003 HC RX IP 258 OP 636: Performed by: ANESTHESIOLOGY

## 2022-11-21 PROCEDURE — 86780 TREPONEMA PALLIDUM: CPT

## 2022-11-21 RX ORDER — PROCHLORPERAZINE 25 MG
25 SUPPOSITORY, RECTAL RECTAL EVERY 12 HOURS PRN
Status: DISCONTINUED | OUTPATIENT
Start: 2022-11-21 | End: 2022-11-21 | Stop reason: HOSPADM

## 2022-11-21 RX ORDER — OXYTOCIN/0.9 % SODIUM CHLORIDE 30/500 ML
340 PLASTIC BAG, INJECTION (ML) INTRAVENOUS CONTINUOUS PRN
Status: DISCONTINUED | OUTPATIENT
Start: 2022-11-21 | End: 2022-11-21 | Stop reason: HOSPADM

## 2022-11-21 RX ORDER — IBUPROFEN 800 MG/1
800 TABLET, FILM COATED ORAL EVERY 6 HOURS PRN
Status: DISCONTINUED | OUTPATIENT
Start: 2022-11-21 | End: 2022-11-22 | Stop reason: HOSPADM

## 2022-11-21 RX ORDER — OXYTOCIN/0.9 % SODIUM CHLORIDE 30/500 ML
1-24 PLASTIC BAG, INJECTION (ML) INTRAVENOUS CONTINUOUS
Status: DISCONTINUED | OUTPATIENT
Start: 2022-11-21 | End: 2022-11-21 | Stop reason: HOSPADM

## 2022-11-21 RX ORDER — OXYTOCIN 10 [USP'U]/ML
10 INJECTION, SOLUTION INTRAMUSCULAR; INTRAVENOUS
Status: DISCONTINUED | OUTPATIENT
Start: 2022-11-21 | End: 2022-11-22 | Stop reason: HOSPADM

## 2022-11-21 RX ORDER — BISACODYL 10 MG
10 SUPPOSITORY, RECTAL RECTAL DAILY PRN
Status: DISCONTINUED | OUTPATIENT
Start: 2022-11-21 | End: 2022-11-22 | Stop reason: HOSPADM

## 2022-11-21 RX ORDER — PROCHLORPERAZINE MALEATE 10 MG
10 TABLET ORAL EVERY 6 HOURS PRN
Status: DISCONTINUED | OUTPATIENT
Start: 2022-11-21 | End: 2022-11-21 | Stop reason: HOSPADM

## 2022-11-21 RX ORDER — IBUPROFEN 800 MG/1
800 TABLET, FILM COATED ORAL
Status: DISCONTINUED | OUTPATIENT
Start: 2022-11-21 | End: 2022-11-22 | Stop reason: HOSPADM

## 2022-11-21 RX ORDER — KETOROLAC TROMETHAMINE 30 MG/ML
30 INJECTION, SOLUTION INTRAMUSCULAR; INTRAVENOUS
Status: DISCONTINUED | OUTPATIENT
Start: 2022-11-21 | End: 2022-11-22 | Stop reason: HOSPADM

## 2022-11-21 RX ORDER — METOCLOPRAMIDE 10 MG/1
10 TABLET ORAL EVERY 6 HOURS PRN
Status: DISCONTINUED | OUTPATIENT
Start: 2022-11-21 | End: 2022-11-21 | Stop reason: HOSPADM

## 2022-11-21 RX ORDER — SODIUM CHLORIDE, SODIUM LACTATE, POTASSIUM CHLORIDE, CALCIUM CHLORIDE 600; 310; 30; 20 MG/100ML; MG/100ML; MG/100ML; MG/100ML
INJECTION, SOLUTION INTRAVENOUS CONTINUOUS PRN
Status: DISCONTINUED | OUTPATIENT
Start: 2022-11-21 | End: 2022-11-21 | Stop reason: HOSPADM

## 2022-11-21 RX ORDER — NALOXONE HYDROCHLORIDE 0.4 MG/ML
0.2 INJECTION, SOLUTION INTRAMUSCULAR; INTRAVENOUS; SUBCUTANEOUS
Status: DISCONTINUED | OUTPATIENT
Start: 2022-11-21 | End: 2022-11-21 | Stop reason: HOSPADM

## 2022-11-21 RX ORDER — CITRIC ACID/SODIUM CITRATE 334-500MG
30 SOLUTION, ORAL ORAL
Status: DISCONTINUED | OUTPATIENT
Start: 2022-11-21 | End: 2022-11-21 | Stop reason: HOSPADM

## 2022-11-21 RX ORDER — DOCUSATE SODIUM 100 MG/1
100 CAPSULE, LIQUID FILLED ORAL DAILY
Status: DISCONTINUED | OUTPATIENT
Start: 2022-11-21 | End: 2022-11-22 | Stop reason: HOSPADM

## 2022-11-21 RX ORDER — MODIFIED LANOLIN
OINTMENT (GRAM) TOPICAL
Status: DISCONTINUED | OUTPATIENT
Start: 2022-11-21 | End: 2022-11-22 | Stop reason: HOSPADM

## 2022-11-21 RX ORDER — CARBOPROST TROMETHAMINE 250 UG/ML
250 INJECTION, SOLUTION INTRAMUSCULAR
Status: DISCONTINUED | OUTPATIENT
Start: 2022-11-21 | End: 2022-11-21 | Stop reason: HOSPADM

## 2022-11-21 RX ORDER — LIDOCAINE 40 MG/G
CREAM TOPICAL
Status: DISCONTINUED | OUTPATIENT
Start: 2022-11-21 | End: 2022-11-21 | Stop reason: HOSPADM

## 2022-11-21 RX ORDER — HYDROCORTISONE 25 MG/G
CREAM TOPICAL 3 TIMES DAILY PRN
Status: DISCONTINUED | OUTPATIENT
Start: 2022-11-21 | End: 2022-11-22 | Stop reason: HOSPADM

## 2022-11-21 RX ORDER — NALOXONE HYDROCHLORIDE 0.4 MG/ML
0.4 INJECTION, SOLUTION INTRAMUSCULAR; INTRAVENOUS; SUBCUTANEOUS
Status: DISCONTINUED | OUTPATIENT
Start: 2022-11-21 | End: 2022-11-21 | Stop reason: HOSPADM

## 2022-11-21 RX ORDER — ACETAMINOPHEN 325 MG/1
650 TABLET ORAL EVERY 4 HOURS PRN
Status: DISCONTINUED | OUTPATIENT
Start: 2022-11-21 | End: 2022-11-22 | Stop reason: HOSPADM

## 2022-11-21 RX ORDER — METHYLERGONOVINE MALEATE 0.2 MG/ML
200 INJECTION INTRAVENOUS
Status: DISCONTINUED | OUTPATIENT
Start: 2022-11-21 | End: 2022-11-22 | Stop reason: HOSPADM

## 2022-11-21 RX ORDER — TERBUTALINE SULFATE 1 MG/ML
0.25 INJECTION, SOLUTION SUBCUTANEOUS
Status: DISCONTINUED | OUTPATIENT
Start: 2022-11-21 | End: 2022-11-21 | Stop reason: HOSPADM

## 2022-11-21 RX ORDER — OXYTOCIN/0.9 % SODIUM CHLORIDE 30/500 ML
340 PLASTIC BAG, INJECTION (ML) INTRAVENOUS CONTINUOUS PRN
Status: DISCONTINUED | OUTPATIENT
Start: 2022-11-21 | End: 2022-11-22 | Stop reason: HOSPADM

## 2022-11-21 RX ORDER — MISOPROSTOL 200 UG/1
800 TABLET ORAL
Status: DISCONTINUED | OUTPATIENT
Start: 2022-11-21 | End: 2022-11-22 | Stop reason: HOSPADM

## 2022-11-21 RX ORDER — OXYTOCIN/0.9 % SODIUM CHLORIDE 30/500 ML
100-340 PLASTIC BAG, INJECTION (ML) INTRAVENOUS CONTINUOUS PRN
Status: DISCONTINUED | OUTPATIENT
Start: 2022-11-21 | End: 2022-11-22 | Stop reason: HOSPADM

## 2022-11-21 RX ORDER — LIDOCAINE HYDROCHLORIDE AND EPINEPHRINE 15; 5 MG/ML; UG/ML
INJECTION, SOLUTION EPIDURAL PRN
Status: DISCONTINUED | OUTPATIENT
Start: 2022-11-21 | End: 2022-11-21

## 2022-11-21 RX ORDER — LIDOCAINE HCL/EPINEPHRINE/PF 2%-1:200K
VIAL (ML) INJECTION PRN
Status: DISCONTINUED | OUTPATIENT
Start: 2022-11-21 | End: 2022-11-21

## 2022-11-21 RX ORDER — FENTANYL/ROPIVACAINE/NS/PF 2MCG/ML-.1
PLASTIC BAG, INJECTION (ML) EPIDURAL
Status: DISCONTINUED | OUTPATIENT
Start: 2022-11-21 | End: 2022-11-21 | Stop reason: HOSPADM

## 2022-11-21 RX ORDER — OXYTOCIN 10 [USP'U]/ML
10 INJECTION, SOLUTION INTRAMUSCULAR; INTRAVENOUS
Status: DISCONTINUED | OUTPATIENT
Start: 2022-11-21 | End: 2022-11-21 | Stop reason: HOSPADM

## 2022-11-21 RX ORDER — ONDANSETRON 4 MG/1
4 TABLET, ORALLY DISINTEGRATING ORAL EVERY 6 HOURS PRN
Status: DISCONTINUED | OUTPATIENT
Start: 2022-11-21 | End: 2022-11-21 | Stop reason: HOSPADM

## 2022-11-21 RX ORDER — MISOPROSTOL 200 UG/1
800 TABLET ORAL
Status: DISCONTINUED | OUTPATIENT
Start: 2022-11-21 | End: 2022-11-21 | Stop reason: HOSPADM

## 2022-11-21 RX ORDER — FENTANYL CITRATE-0.9 % NACL/PF 10 MCG/ML
100 PLASTIC BAG, INJECTION (ML) INTRAVENOUS EVERY 5 MIN PRN
Status: DISCONTINUED | OUTPATIENT
Start: 2022-11-21 | End: 2022-11-21 | Stop reason: HOSPADM

## 2022-11-21 RX ORDER — ACETAMINOPHEN 325 MG/1
650 TABLET ORAL EVERY 4 HOURS PRN
Status: DISCONTINUED | OUTPATIENT
Start: 2022-11-21 | End: 2022-11-21 | Stop reason: HOSPADM

## 2022-11-21 RX ORDER — ONDANSETRON 2 MG/ML
4 INJECTION INTRAMUSCULAR; INTRAVENOUS EVERY 6 HOURS PRN
Status: DISCONTINUED | OUTPATIENT
Start: 2022-11-21 | End: 2022-11-21 | Stop reason: HOSPADM

## 2022-11-21 RX ORDER — METHYLERGONOVINE MALEATE 0.2 MG/ML
200 INJECTION INTRAVENOUS
Status: DISCONTINUED | OUTPATIENT
Start: 2022-11-21 | End: 2022-11-21 | Stop reason: HOSPADM

## 2022-11-21 RX ORDER — MISOPROSTOL 200 UG/1
400 TABLET ORAL
Status: DISCONTINUED | OUTPATIENT
Start: 2022-11-21 | End: 2022-11-22 | Stop reason: HOSPADM

## 2022-11-21 RX ORDER — CARBOPROST TROMETHAMINE 250 UG/ML
250 INJECTION, SOLUTION INTRAMUSCULAR
Status: DISCONTINUED | OUTPATIENT
Start: 2022-11-21 | End: 2022-11-22 | Stop reason: HOSPADM

## 2022-11-21 RX ORDER — MISOPROSTOL 200 UG/1
400 TABLET ORAL
Status: DISCONTINUED | OUTPATIENT
Start: 2022-11-21 | End: 2022-11-21 | Stop reason: HOSPADM

## 2022-11-21 RX ORDER — NALBUPHINE HYDROCHLORIDE 10 MG/ML
2.5-5 INJECTION, SOLUTION INTRAMUSCULAR; INTRAVENOUS; SUBCUTANEOUS EVERY 6 HOURS PRN
Status: DISCONTINUED | OUTPATIENT
Start: 2022-11-21 | End: 2022-11-22 | Stop reason: HOSPADM

## 2022-11-21 RX ORDER — METOCLOPRAMIDE HYDROCHLORIDE 5 MG/ML
10 INJECTION INTRAMUSCULAR; INTRAVENOUS EVERY 6 HOURS PRN
Status: DISCONTINUED | OUTPATIENT
Start: 2022-11-21 | End: 2022-11-21 | Stop reason: HOSPADM

## 2022-11-21 RX ADMIN — KETOROLAC TROMETHAMINE 30 MG: 30 INJECTION, SOLUTION INTRAMUSCULAR; INTRAVENOUS at 20:18

## 2022-11-21 RX ADMIN — SODIUM CHLORIDE, POTASSIUM CHLORIDE, SODIUM LACTATE AND CALCIUM CHLORIDE 1000 ML: 600; 310; 30; 20 INJECTION, SOLUTION INTRAVENOUS at 11:08

## 2022-11-21 RX ADMIN — Medication 2 MILLI-UNITS/MIN: at 16:37

## 2022-11-21 RX ADMIN — LIDOCAINE HYDROCHLORIDE,EPINEPHRINE BITARTRATE 3 ML: 15; .005 INJECTION, SOLUTION EPIDURAL; INFILTRATION; INTRACAUDAL; PERINEURAL at 11:39

## 2022-11-21 RX ADMIN — SODIUM CHLORIDE, POTASSIUM CHLORIDE, SODIUM LACTATE AND CALCIUM CHLORIDE 250 ML: 600; 310; 30; 20 INJECTION, SOLUTION INTRAVENOUS at 12:34

## 2022-11-21 RX ADMIN — LIDOCAINE HYDROCHLORIDE,EPINEPHRINE BITARTRATE 8 ML: 20; .005 INJECTION, SOLUTION EPIDURAL; INFILTRATION; INTRACAUDAL; PERINEURAL at 11:41

## 2022-11-21 RX ADMIN — DOCUSATE SODIUM 100 MG: 100 CAPSULE, LIQUID FILLED ORAL at 20:18

## 2022-11-21 RX ADMIN — Medication: at 11:42

## 2022-11-21 RX ADMIN — LIDOCAINE HYDROCHLORIDE 20 ML: 10 INJECTION, SOLUTION EPIDURAL; INFILTRATION; INTRACAUDAL; PERINEURAL at 19:13

## 2022-11-21 ASSESSMENT — ACTIVITIES OF DAILY LIVING (ADL)
TOILETING_ISSUES: NO
DRESSING/BATHING_DIFFICULTY: NO
DIFFICULTY_EATING/SWALLOWING: NO
ADLS_ACUITY_SCORE: 31
ADLS_ACUITY_SCORE: 18
ADLS_ACUITY_SCORE: 18
WEAR_GLASSES_OR_BLIND: NO
ADLS_ACUITY_SCORE: 18
FALL_HISTORY_WITHIN_LAST_SIX_MONTHS: NO
DOING_ERRANDS_INDEPENDENTLY_DIFFICULTY: NO
ADLS_ACUITY_SCORE: 18
CHANGE_IN_FUNCTIONAL_STATUS_SINCE_ONSET_OF_CURRENT_ILLNESS/INJURY: NO
WALKING_OR_CLIMBING_STAIRS_DIFFICULTY: NO
CONCENTRATING,_REMEMBERING_OR_MAKING_DECISIONS_DIFFICULTY: NO
ADLS_ACUITY_SCORE: 18

## 2022-11-21 NOTE — PROGRESS NOTES
PROGRESS NOTE    Doing well. Comfortable with epidural  FHT: 140s + accelerations, no decelerations  TOCO: Q4min  SVE: 8cm with BBOW - AROM for clear fluid.  Cervix now stretchy at 6cm/80/-2    A/P: 31yo  @ 38w5d - labor  - anticipate   - CAT 1 tracing    Flory Patterson, DO

## 2022-11-21 NOTE — ANESTHESIA PROCEDURE NOTES
"Epidural catheter Procedure Note    Pre-Procedure   Staff -        Anesthesiologist:  Clyde Castillo MD       Performed By: anesthesiologist       Location: OB       Procedure Start/Stop Times: 11/21/2022 11:30 AM and 11/21/2022 11:42 AM       Pre-Anesthestic Checklist: patient identified, IV checked, risks and benefits discussed, informed consent, monitors and equipment checked and pre-op evaluation  Timeout:       Correct Patient: Yes        Correct Procedure: Yes        Correct Site: Yes        Correct Position: Yes   Procedure Documentation  Procedure: epidural catheter       Patient Position: sitting       Patient Prep/Sterile Barriers: sterile gloves, mask, patient draped       Skin prep: Chloraprep       Local skin infiltrated with mL of 1% lidocaine.        Insertion Site: L3-4. (midline approach).       Technique: LORT saline        LY at 7 cm.       Needle Type: Avolent       Needle Gauge: 18.        Needle Length (Inches): 3.5        Catheter: 20 G.          Catheter threaded easily.         7 cm epidural space.         Threaded 14 cm at skin.         # of attempts: 1 and  # of redirects:  0    Assessment/Narrative         Paresthesias: No.       Test dose of mL lidocaine 1.5% w/ 1:200,000 epinephrine at.         Test dose negative, 3 minutes after injection, for signs of intravascular, subdural, or intrathecal injection.       Insertion/Infusion Method: LORT saline       Aspiration negative for Heme or CSF via Epidural Catheter.    Medication(s) Administered   Medication Administration Time: 11/21/2022 11:30 AM      FOR [facility] ([facility]) ONLY:   Pain Team Contact information: please page the Pain Team Via [employer]. Search \"Pain\". During daytime hours, please page the attending first. At night please page the resident first.    "

## 2022-11-21 NOTE — PLAN OF CARE
"  Problem: Plan of Care - These are the overarching goals to be used throughout the patient stay.    Goal: Plan of Care Review  Description: The Plan of Care Review/Shift note should be completed every shift.  The Outcome Evaluation is a brief statement about your assessment that the patient is improving, declining, or no change.  This information will be displayed automatically on your shift note.  Outcome: Progressing  Goal: Patient-Specific Goal (Individualized)  Description: You can add care plan individualizations to a care plan. Examples of Individualization might be:  \"Parent requests to be called daily at 9am for status\", \"I have a hard time hearing out of my right ear\", or \"Do not touch me to wake me up as it startles me\".  Outcome: Progressing  Goal: Absence of Hospital-Acquired Illness or Injury  Outcome: Progressing  Goal: Optimal Comfort and Wellbeing  Outcome: Progressing  Goal: Readiness for Transition of Care  Outcome: Progressing    Patient verbalizes comfort with labor epidural.   Quirino is a good support for her and is very helpful.       "

## 2022-11-21 NOTE — PROGRESS NOTES
Labor Progress Note    Assessment/Plan  32 year old  at 38w5d gestational age admitted in active labor.  History of prior  section for failure to progress in 2018.  History of preeclampsia and postpartum hemorrhage.  S/p AROM with clear fluid.  Ineffective contraction pattern with contraction frequency every 5 to 6 minutes. Reassuring FHR tracing.    - Start regular dose pitocin   - Continue to monitor FHR  - Anticipate     Subjective  Patient lying on her side with peanut ball.  She states she is feeling increased pressure and thinks that her contractions are starting to get closer together.  Denies any headache or changes in vision.  Discussed use of Pitocin to augment labor.    Objective  Vital signs in last 24 hours  Temp:  [98.3  F (36.8  C)-99.2  F (37.3  C)] 98.8  F (37.1  C)  Pulse:  [95-98] 95  Resp:  [16] 16  BP: (107-137)/(57-81) 137/79  SpO2:  [98 %-100 %] 99 %    Physical Exam  General: In bed on her left side with peanut ball between her legs  Abd: Gravid, soft, nontender  Cervix: 6/80/-2 per Dr. Patterson at last check around 1430  FHR: Baseline 143/ mod variability/+ accels/no decels; Category I tracing  [place]: Contractions Q 5-6 min  Extremities: No peripheral edema    Pertinent Labs   Lab Results   Component Value Date    ABORH B POS 2022    HGB 11.0 2022    HGB 7.0 08/15/2018        Patient discussed with attending physician, Dr. Leonardo Dutta OB , who agrees with the plan.     Hiwot Pacheco MD PGY1 2022  [facility]

## 2022-11-21 NOTE — ANESTHESIA PREPROCEDURE EVALUATION
Anesthesia Pre-Procedure Evaluation    Patient: Sherlyn Owusu   MRN: [id number] : 1990        Procedure : * No procedures listed *          History reviewed. No pertinent past medical history.   Past Surgical History:   Procedure Laterality Date      SECTION N/A 2018    Procedure:  SECTION;;  Surgeon: Sandra Preciado MD;  Location: [facility] L+D      Allergies   Allergen Reactions     Latex       Social History     Tobacco Use     Smoking status: Never     Smokeless tobacco: Never   Substance Use Topics     Alcohol use: No      Wt Readings from Last 1 Encounters:   22 82.1 kg (181 lb)        Anesthesia Evaluation   Pt has had prior anesthetic. Type: OB Labor Epidural.    No history of anesthetic complications       ROS/MED HX  ENT/Pulmonary:  - neg pulmonary ROS     Neurologic:  - neg neurologic ROS     Cardiovascular:     (+) --PIH ---    METS/Exercise Tolerance:     Hematologic:  - neg hematologic  ROS     Musculoskeletal:       GI/Hepatic:  - neg GI/hepatic ROS     Renal/Genitourinary:       Endo:     (+) Obesity,     Psychiatric/Substance Use:  - neg psychiatric ROS     Infectious Disease:       Malignancy:       Other:      (+) , previous , TOLAC candidate,        Physical Exam    Airway        Mallampati: III    Neck ROM: full     Respiratory Devices and Support         Dental  no notable dental history         Cardiovascular   cardiovascular exam normal          Pulmonary   pulmonary exam normal                OUTSIDE LABS:  CBC:   Lab Results   Component Value Date    WBC 9.7 2022    WBC 22.1 (H) 08/15/2018    HGB 11.0 (L) 2022    HGB 7.0 (L) 08/15/2018    HCT 33.6 (L) 2022    HCT 24.2 (L) 2018     2022     (L) 08/15/2018     BMP:   Lab Results   Component Value Date     08/15/2018     2018    POTASSIUM 4.7 08/15/2018    POTASSIUM 3.8 2018    CHLORIDE 106 08/15/2018     CHLORIDE 103 08/13/2018    CO2 26 08/15/2018    CO2 21 08/13/2018    BUN 13 08/15/2018    BUN 16 08/13/2018    CR 1.21 (H) 08/15/2018    CR 1.33 (H) 08/15/2018     (H) 08/15/2018    GLC 82 08/13/2018     COAGS: No results found for: PTT, INR, FIBR  POC: No results found for: BGM, HCG, HCGS  HEPATIC:   Lab Results   Component Value Date    ALT 33 08/14/2018    AST 45 08/14/2018     OTHER:   Lab Results   Component Value Date    LACT 1.7 08/15/2018    OANH 7.7 (L) 08/15/2018    MAG 7.6 (HH) 08/14/2018       Anesthesia Plan    ASA Status:  3      Anesthesia Type: Epidural.              Consents    Anesthesia Plan(s) and associated risks, benefits, and realistic alternatives discussed. Questions answered and patient/representative(s) expressed understanding.    - Discussed:     - Discussed with:  Patient         Postoperative Care            Comments:    Other Comments: GARY benz OB ROS.       Clyde Castillo MD

## 2022-11-21 NOTE — H&P
OBSTETRICS ADMISSION HISTORY & PHYSICAL  DATE OF ADMISSION: 2022  7:33 AM        Assessment and Plan:   Assessment:   Sherlyn Owusu is a 32 year old  at 38w5d in active labor. GBS status is negative. Patient had prior  for failure to progress in 2018 and has history of Preeclampsia and postpartum hemorrhage requiring transfusion.    Patient Active Problem List   Diagnosis     Encounter for triage in pregnant patient     Hypertension in pregnancy     S/P  section     Uterine contractions         PLAN:   1. Admit - see IP orders  2. GBS: negative. Antibiotics are not indicated   3. Comfort plan: natural  4. Will monitor labor progress along with RN and update attending physician  5.   Will notify Dr Patterson and Dr. Zendejas I[facility]OB covering  6.   Anticipate     Patient discussed with attending physician, Dr. Zendejas , who agrees with the plan.     Hiwot Pacheco MD PGY1 2022  [facility]         Chief Complaint:     Contractions       History of Present Illness:     Sherlyn Owusu is a 32 year old year old  at 38w5d. Patient received prenatal care with Dr. Patterson at [facility].  Presents to OB triage with contractions since 5 AM this morning.  She states the contractions have occurred every 3 to 5 minutes and she is very uncomfortable.  She denies any vaginal bleeding.  Endorses normal fetal movement.  She states that last night she was urinating when she looked in the toilet it was not the usual yellow color and notes that it was clear fluid possibly rupture of membranes.    Their prenatal course has been complicated by  prior  and history of postpartum hemorrhage requiring transfusion and preeclampsia.  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 Patient has had blood pressures elevated to 130s over 70s during this pregnancy.    Prenatal labs   Lab  Results   Component Value Date    ABO B 2018    RH Pos 2018    AS Neg 2018    HEPBANG Non-reactive 2017    HGB 7.0 (L) 08/15/2018    GBS neg 2018    GBS neg 2018    GBS neg 2018     GBS was collected on 22         Obstetrical History:     OB History    Para Term  AB Living   2 1 1 0 0 1   SAB IAB Ectopic Multiple Live Births   0 0 0 0 1      # Outcome Date GA Lbr Sam/2nd Weight Sex Delivery Anes PTL Lv   2 Current            1 Term 18 40w5d  3.3 kg (7 lb 4.4 oz) M CS-LTranv   CHRISTIAN      Complications: Fetal Intolerance, Preeclampsia/Hypertension, Arrest of descent, delivered, current hospitalization      Name: DINESH MASSEY,AJCK HILTON      Apgar1: 7  Apgar5: 6            Immunzations:       There is no immunization history on file for this patient.           Past Medical History:   History reviewed. No pertinent past medical history.         Past Surgical History:     Past Surgical History:   Procedure Laterality Date      SECTION N/A 2018    Procedure:  SECTION;;  Surgeon: Sandra Preciado MD;  Location:  L+D            Family History:   History reviewed. No pertinent family history.         Social History:   no tobacco use  no alcohol use  no illicit drug use         Medications:   No current facility-administered medications on file prior to encounter.  acetaminophen (TYLENOL) 325 MG tablet, Take 2 tablets (650 mg) by mouth every 4 hours as needed for mild pain or pain  ferrous sulfate (IRON) 325 (65 Fe) MG tablet, Take 1 tablet (325 mg) by mouth daily (with breakfast)  Prenatal Vit-Fe Fumarate-FA (PRENATAL MULTIVITAMIN PLUS IRON) 27-0.8 MG TABS per tablet, Take 1 tablet by mouth daily             Allergies:   Latex         Review of Systems:   CONSTITUTIONAL: no fatigue, no unexpected change in weight  SKIN: no worrisome rashes or lesions  EYES: no acute vision problems or changes  ENT: no ear problems, no  "mouth problems, no throat problems  RESP: no significant cough, no shortness of breath  CV: no chest pain, no palpitations, no new or worsening peripheral edema  GI: no nausea, no vomiting, no constipation, no diarrhea  : no frequency, no dysuria, no hematuria  NEURO: no weakness, no dizziness, no headaches  ENDOCRINE: no temperature intolerance, no skin/hair changes  PSYCHIATRIC: NEGATIVE for changes in mood or trouble with sleep         Physical Exam:   Vitals:   /76 (BP Location: Right arm, Patient Position: Semi-Moser's, Cuff Size: Adult Regular)   Pulse 96   Temp 98.3  F (36.8  C) (Oral)   Resp 16   Ht 1.575 m (5' 2\")   Wt 82.1 kg (181 lb)   BMI 33.11 kg/m    181 lbs 0 oz  Estimated body mass index is 33.11 kg/m  as calculated from the following:    Height as of this encounter: 1.575 m (5' 2\").    Weight as of this encounter: 82.1 kg (181 lb).    GEN: Awake, alert in no apparent distress   HEENT: grossly normal  NECK: no lymphadenopathy or thryoidomegaly  RESPIRATORY: clear to auscultation bilaterally, no increased work of breathing  BACK:  no costovertebral angle tenderness   CARDIOVASCULAR: RRR, no murmur  ABDOMEN: gravid, vertex by Leopold's  PELVIC:  no fluid noted, no blood noted.  Presenting part: head  Cervix: 5/80/-2 per RN  EXT:  no edema or calf tenderness  Confirmed VTX by Ultrasound? No    Electronic Fetal Monitoring:  Baseline rate normal  Variability moderate  Accelerations present  Decelerations not present    Assessment: Category I EFM interpretation suggests absence of concern for fetal metabolic acidemia at this time due to moderate variability and accelerations present    Uterine Activity irregular.      Strip reviewed on unit      "

## 2022-11-21 NOTE — PROGRESS NOTES
Pt arrives to unit and states she has been ctx since 0500 about kaela 3-4 minutes, lasting 30-45 seconds.  She rates then an 8/10 in intensity.  She states she possibly had some LOF last night when goping to the bathroom, but no leaking since then.  Placed on monitor and VSS.  Denies pre-e symptoms.  SVE 5/80/-2.  Resident notified of pt admission and ROM+ collected.

## 2022-11-21 NOTE — PROGRESS NOTES
Inpatient orders placed and pt escorted to room 248.  Admission labs drawn, COVID swab sent, ROM+ was negative.  Pt was oriented to room and PIV saline lock placed.  Report given to Hiwot Newell RN.

## 2022-11-22 VITALS
DIASTOLIC BLOOD PRESSURE: 69 MMHG | HEART RATE: 98 BPM | WEIGHT: 181 LBS | HEIGHT: 62 IN | TEMPERATURE: 98.2 F | RESPIRATION RATE: 16 BRPM | SYSTOLIC BLOOD PRESSURE: 114 MMHG | BODY MASS INDEX: 33.31 KG/M2 | OXYGEN SATURATION: 100 %

## 2022-11-22 PROBLEM — Z98.891 HISTORY OF C-SECTION: Status: ACTIVE | Noted: 2022-11-22

## 2022-11-22 PROBLEM — Z98.891 S/P CESAREAN SECTION: Status: RESOLVED | Noted: 2018-08-14 | Resolved: 2022-11-22

## 2022-11-22 PROBLEM — O34.219 VBAC (VAGINAL BIRTH AFTER CESAREAN): Status: ACTIVE | Noted: 2022-11-22

## 2022-11-22 LAB — HGB BLD-MCNC: 8 G/DL (ref 11.7–15.7)

## 2022-11-22 PROCEDURE — 85018 HEMOGLOBIN: CPT | Performed by: OBSTETRICS & GYNECOLOGY

## 2022-11-22 PROCEDURE — 36415 COLL VENOUS BLD VENIPUNCTURE: CPT | Performed by: OBSTETRICS & GYNECOLOGY

## 2022-11-22 RX ORDER — IBUPROFEN 600 MG/1
600 TABLET, FILM COATED ORAL EVERY 6 HOURS PRN
Qty: 40 TABLET | Refills: 0 | Status: SHIPPED | OUTPATIENT
Start: 2022-11-22

## 2022-11-22 ASSESSMENT — ACTIVITIES OF DAILY LIVING (ADL)
ADLS_ACUITY_SCORE: 18

## 2022-11-22 NOTE — PROGRESS NOTES
Patient comfortable in labor now with epidural.      Would like the baby to come out.     First baby was just over 7lbs and pushed for over 3 hours without descent.  This baby is bigger per  and was measuring 6lb 12 oz over 2 weeks ago.       She is currently on Pitocin 2mu/min.    Cervix is 9cm, swollen on the R side and anteriorly.      Category 1 tracing.   She would like to continue to labor as has made progress in the last hour or so with addition of pitocin.      Will ensure labs have been obtained and re-examine in an hour or so.     Stefania Levy DO, FACOG  11/21/2022 6:12 PM

## 2022-11-22 NOTE — L&D DELIVERY NOTE
VAGINAL- Vacuum DELIVERY NOTE    PRE DELIVERY DIAGNOSIS  1) Intrauterine pregnancy at 38w5d   2) Previous   3) Category 2 tracing      POST DELIVERY DIAGNOSIS  1) Intrauterine pregnancy at 38w5d   2) Delivery of a viable female.  3) Apgars: 8 and 9  4) weight:7lb 1 oz    Delivery Method/Type:   VAVD    PROVIDER:   Stefania Levy DO     ANESTHESIA: Epidural    COMPLICATIONS: None apparent    DESCRIPTION OF PROCEDURE  Sherlyn Owusu is a 32 year old  with Dr. Patterson who was admitted for labor.  At pushing, with Category 2 tracing secondary to severe variables, intermittent tachy, consent was obtained for application of a vacuum.   Delivery team called.  Bladder catheter was removed, as was FSE.   External monitoring commenced.   Mity Vac used and with 2 pulls over 2 mins, zero pop offs from a +2 station.   Direct OA positioning was noted.  Patient had a VAVD.  Delayed cord clamping performed.  No gross fetal defects were observed, but baby was handed to delivery team.   Pitocin was administered. Placenta delivered intact with 3 vessel cord. The perineum was then evaluated and noted to have a bilateral clitoral laceration that was repaired in the usual manner with 3-0 Vicryl as was the R labial and L sulcul tear.   Delivery QBL (mL): 500.    Stefania Levy DO, FACOG

## 2022-11-22 NOTE — DISCHARGE SUMMARY
[facility] Discharge Summary    Sherlyn Owusu MRN# [id number]   Age: 32 year old YOB: 1990     Date of Admission:  2022  Date of Discharge::  2022  Admitting Physician:  Flory Patterson DO  Discharge Physician:  Lori Royal MD     Home clinic: [facility], [place], operated by [facility]            Admission Diagnoses:   Encounter for triage in pregnant patient [Z36.89]  Uterine contractions [O47.9]  Previous csection   TOLAC          Discharge Diagnosis:   Normal spontaneous vaginal delivery  Intrauterine pregnancy at 39 weeks gestation          Procedures:   Procedure(s):                   Medications Prior to Admission:     Medications Prior to Admission   Medication Sig Dispense Refill Last Dose     acetaminophen (TYLENOL) 325 MG tablet Take 2 tablets (650 mg) by mouth every 4 hours as needed for mild pain or pain 100 tablet 0 More than a month     ferrous sulfate (IRON) 325 (65 Fe) MG tablet Take 1 tablet (325 mg) by mouth daily (with breakfast) 90 tablet 2 Past Month     Prenatal Vit-Fe Fumarate-FA (PRENATAL MULTIVITAMIN PLUS IRON) 27-0.8 MG TABS per tablet Take 1 tablet by mouth daily 100 tablet 3 2022             Discharge Medications:     Current Discharge Medication List      START taking these medications    Details   ibuprofen (ADVIL/MOTRIN) 600 MG tablet Take 1 tablet (600 mg) by mouth every 6 hours as needed  Qty: 40 tablet, Refills: 0    Associated Diagnoses:  (vaginal birth after )         CONTINUE these medications which have NOT CHANGED    Details   acetaminophen (TYLENOL) 325 MG tablet Take 2 tablets (650 mg) by mouth every 4 hours as needed for mild pain or pain  Qty: 100 tablet, Refills: 0    Associated Diagnoses: S/P  section      ferrous sulfate (IRON) 325 (65 Fe) MG tablet Take 1 tablet (325 mg) by mouth daily (with breakfast)  Qty: 90 tablet, Refills: 2    Associated Diagnoses: S/P  section      Prenatal  Vit-Fe Fumarate-FA (PRENATAL MULTIVITAMIN PLUS IRON) 27-0.8 MG TABS per tablet Take 1 tablet by mouth daily  Qty: 100 tablet, Refills: 3    Associated Diagnoses: S/P  section                   Consultations:   No consultations were requested during this admission          Brief History of Labor:   The patient was admitted in labor,  she progressed through normal labor curve to complete.  spontaneous vaginal delivery without complications              Hospital Course:   The patient's hospital course was unremarkable.  On discharge, her pain was well controlled. Vaginal bleeding is similar to peak menstrual flow.  Voiding without difficulty.  Ambulating well and tolerating a normal diet.  No fever.  Breastfeeding well.  Infant is stable.  No bowel movement yet.*  She was discharged on post-partum day #1.    Post-partum hemoglobin:   Hemoglobin   Date Value Ref Range Status   2022 11.0 (L) 11.7 - 15.7 g/dL Final   08/15/2018 7.0 (L) 11.7 - 15.7 g/dL Final             Discharge Instructions and Follow-Up:   Discharge diet: Regular   Discharge activity: No sex for 6 week(s)   Discharge follow-up: Follow up with Dr. Patterson in 6 weeks   Wound care:            Discharge Disposition:   Discharged to home      Attestation:  I have reviewed today's vital signs, notes, medications, labs and imaging.    Lori Royal MD

## 2022-11-22 NOTE — PROGRESS NOTES
Labor Progress Note    Assessment/Plan  32 year old  at 38w5d gestational age admitted in active labor.  History of prior  section for failure to progress in 2018.  History of preeclampsia and postpartum hemorrhage.  S/p AROM with clear fluid. Pitocin at 2 ml/hr. Contractions now q 2 minutes. S/p FSE placement.     - Continue to monitor FHR  - Continue pitocin   - Anticipate   - Will recheck in 1 hour  - Dr. Levy taking over for Dr. Patterson.     Subjective  Patient laying on her side. Feeling increased pressure and increased intensity of contractions. Patient reports she would like to continue with labor at this time.       Objective  Vital signs in last 24 hours  Temp:  [98.3  F (36.8  C)-99.2  F (37.3  C)] 98.8  F (37.1  C)  Pulse:  [95-98] 95  Resp:  [16] 16  BP: (107-145)/(57-81) 127/60  SpO2:  [96 %-100 %] 100 %      Physical Exam  General: In bed on side  Abd: Gravid, soft, nontender  Cervix: 9cm, 90% effaced, 0 station, bloody show, per RN  FHR: Baseline 145/moderate variability/positive accels/no decels; Category I tracing  Belk: Contractions Q 2 min  Extremities: no peripheral edema    Pertinent Labs   Lab Results   Component Value Date    ABORH B POS 2022    HGB 11.0 2022    HGB 7.0 08/15/2018            Patient discussed with attending physician, Dr. Levy , who agrees with the plan.     Emily Tompkins DO PGY1 2022  [facility]

## 2022-11-22 NOTE — ANESTHESIA POSTPROCEDURE EVALUATION
Patient: Sherlyn Owusu    Procedure: * No procedures listed *       Anesthesia Type:  No value filed.    Note:  Disposition: Inpatient   Postop Pain Control: Uneventful            Sign Out: Well controlled pain   PONV: No   Neuro/Psych: Uneventful            Sign Out: Acceptable/Baseline neuro status   Airway/Respiratory: Uneventful            Sign Out: Acceptable/Baseline resp. status   CV/Hemodynamics: Uneventful            Sign Out: Acceptable CV status; No obvious hypovolemia; No obvious fluid overload   Other NRE: NONE   DID A NON-ROUTINE EVENT OCCUR? No           Last vitals:  There were no vitals filed for this visit.    Electronically Signed By: Micky Guaman MD  November 22, 2022  3:22 PM

## 2022-11-22 NOTE — PROVIDER NOTIFICATION
Dr. Valenzuela notified regarding patient's temperature and that patient's vitals triggered [facility]'s sepsis warning. Orders are to continue monitoring patient's vitals and continue lactated ringer's at 125mL/hr.

## 2022-11-22 NOTE — PLAN OF CARE
Mother bonding well with baby. Breastfeeding on demand. Denies pain on assessment. VS WDL. Stable for discharge. Education reviewed with parents. Verbalized understanding.    Problem: Postpartum (Vaginal Delivery)  Goal: Optimal Pain Control and Function  Outcome: Met     Problem: Postpartum (Vaginal Delivery)  Goal: Absence of Infection Signs and Symptoms  Outcome: Met

## 2022-11-22 NOTE — PLAN OF CARE
Problem: Postpartum (Vaginal Delivery)  Goal: Successful Maternal Role Transition  Outcome: Progressing     Problem: Postpartum (Vaginal Delivery)  Goal: Absence of Infection Signs and Symptoms  Outcome: Progressing     Problem: Postpartum (Vaginal Delivery)  Goal: Effective Urinary Elimination  Outcome: Progressing     Vitals wdl. Bonding well with infant. Ambulating and voiding independently.

## 2023-01-10 NOTE — ADDENDUM NOTE
Addendum  created 01/10/23 1212 by Clyde Castillo MD    Intraprocedure Event deleted, Intraprocedure Event edited

## 2023-06-04 ENCOUNTER — HEALTH MAINTENANCE LETTER (OUTPATIENT)
Age: 33
End: 2023-06-04

## 2023-10-12 ENCOUNTER — LAB REQUISITION (OUTPATIENT)
Dept: LAB | Facility: CLINIC | Age: 33
End: 2023-10-12
Payer: COMMERCIAL

## 2023-10-12 DIAGNOSIS — Z34.91 ENCOUNTER FOR SUPERVISION OF NORMAL PREGNANCY, UNSPECIFIED, FIRST TRIMESTER: ICD-10-CM

## 2023-10-12 PROCEDURE — 84702 CHORIONIC GONADOTROPIN TEST: CPT | Performed by: PHYSICIAN ASSISTANT

## 2023-10-13 LAB — HCG INTACT+B SERPL-ACNC: 1362 MIU/ML

## 2024-03-04 ENCOUNTER — LAB REQUISITION (OUTPATIENT)
Dept: LAB | Facility: CLINIC | Age: 34
End: 2024-03-04

## 2024-03-04 DIAGNOSIS — Z3A.25 25 WEEKS GESTATION OF PREGNANCY: ICD-10-CM

## 2024-03-04 LAB — HBA1C MFR BLD: 5.3 %

## 2024-03-04 PROCEDURE — 83036 HEMOGLOBIN GLYCOSYLATED A1C: CPT | Performed by: PHYSICIAN ASSISTANT

## 2024-03-04 PROCEDURE — 86803 HEPATITIS C AB TEST: CPT | Performed by: PHYSICIAN ASSISTANT

## 2024-03-05 LAB — HCV AB SERPL QL IA: NONREACTIVE

## 2024-03-25 ENCOUNTER — LAB REQUISITION (OUTPATIENT)
Dept: LAB | Facility: CLINIC | Age: 34
End: 2024-03-25

## 2024-03-25 DIAGNOSIS — Z11.3 ENCOUNTER FOR SCREENING FOR INFECTIONS WITH A PREDOMINANTLY SEXUAL MODE OF TRANSMISSION: ICD-10-CM

## 2024-03-25 PROCEDURE — 86592 SYPHILIS TEST NON-TREP QUAL: CPT | Performed by: PHYSICIAN ASSISTANT

## 2024-03-26 LAB — RPR SER QL: NONREACTIVE

## 2024-05-21 ENCOUNTER — LAB REQUISITION (OUTPATIENT)
Dept: LAB | Facility: CLINIC | Age: 34
End: 2024-05-21
Payer: COMMERCIAL

## 2024-05-21 DIAGNOSIS — Z36.85 ENCOUNTER FOR ANTENATAL SCREENING FOR STREPTOCOCCUS B: ICD-10-CM

## 2024-05-21 PROCEDURE — 87653 STREP B DNA AMP PROBE: CPT | Mod: ORL | Performed by: OBSTETRICS & GYNECOLOGY

## 2024-05-22 LAB — GP B STREP DNA SPEC QL NAA+PROBE: NEGATIVE

## 2024-06-01 ENCOUNTER — HOSPITAL ENCOUNTER (INPATIENT)
Facility: CLINIC | Age: 34
LOS: 1 days | Discharge: HOME | End: 2024-06-03
Attending: STUDENT IN AN ORGANIZED HEALTH CARE EDUCATION/TRAINING PROGRAM | Admitting: STUDENT IN AN ORGANIZED HEALTH CARE EDUCATION/TRAINING PROGRAM
Payer: COMMERCIAL

## 2024-06-01 PROBLEM — Z36.89 ENCOUNTER FOR TRIAGE IN PREGNANT PATIENT: Status: ACTIVE | Noted: 2018-08-13

## 2024-06-01 RX ORDER — LIDOCAINE 40 MG/G
CREAM TOPICAL
Status: DISCONTINUED | OUTPATIENT
Start: 2024-06-01 | End: 2024-06-02 | Stop reason: HOSPADM

## 2024-06-02 ENCOUNTER — ANESTHESIA EVENT (OUTPATIENT)
Dept: OBGYN | Facility: CLINIC | Age: 34
End: 2024-06-02
Payer: COMMERCIAL

## 2024-06-02 ENCOUNTER — ANESTHESIA (OUTPATIENT)
Dept: OBGYN | Facility: CLINIC | Age: 34
End: 2024-06-02
Payer: COMMERCIAL

## 2024-06-02 PROBLEM — Z3A.39 39 WEEKS GESTATION OF PREGNANCY: Status: ACTIVE | Noted: 2024-06-02

## 2024-06-02 LAB
ABO/RH(D): NORMAL
ANTIBODY SCREEN: NEGATIVE
HGB BLD-MCNC: 10.9 G/DL (ref 11.7–15.7)
SPECIMEN EXPIRATION DATE: NORMAL
T PALLIDUM AB SER QL: NONREACTIVE

## 2024-06-02 PROCEDURE — 10907ZC DRAINAGE OF AMNIOTIC FLUID, THERAPEUTIC FROM PRODUCTS OF CONCEPTION, VIA NATURAL OR ARTIFICIAL OPENING: ICD-10-PCS | Performed by: STUDENT IN AN ORGANIZED HEALTH CARE EDUCATION/TRAINING PROGRAM

## 2024-06-02 PROCEDURE — 722N000001 HC LABOR CARE VAGINAL DELIVERY SINGLE

## 2024-06-02 PROCEDURE — 250N000013 HC RX MED GY IP 250 OP 250 PS 637: Performed by: OBSTETRICS & GYNECOLOGY

## 2024-06-02 PROCEDURE — 86900 BLOOD TYPING SEROLOGIC ABO: CPT | Performed by: STUDENT IN AN ORGANIZED HEALTH CARE EDUCATION/TRAINING PROGRAM

## 2024-06-02 PROCEDURE — 250N000009 HC RX 250: Performed by: OBSTETRICS & GYNECOLOGY

## 2024-06-02 PROCEDURE — 85018 HEMOGLOBIN: CPT | Performed by: STUDENT IN AN ORGANIZED HEALTH CARE EDUCATION/TRAINING PROGRAM

## 2024-06-02 PROCEDURE — 258N000003 HC RX IP 258 OP 636: Performed by: STUDENT IN AN ORGANIZED HEALTH CARE EDUCATION/TRAINING PROGRAM

## 2024-06-02 PROCEDURE — 00HU33Z INSERTION OF INFUSION DEVICE INTO SPINAL CANAL, PERCUTANEOUS APPROACH: ICD-10-PCS | Performed by: ANESTHESIOLOGY

## 2024-06-02 PROCEDURE — 3E0R3BZ INTRODUCTION OF ANESTHETIC AGENT INTO SPINAL CANAL, PERCUTANEOUS APPROACH: ICD-10-PCS | Performed by: ANESTHESIOLOGY

## 2024-06-02 PROCEDURE — 250N000011 HC RX IP 250 OP 636: Performed by: ANESTHESIOLOGY

## 2024-06-02 PROCEDURE — 120N000001 HC R&B MED SURG/OB

## 2024-06-02 PROCEDURE — 86780 TREPONEMA PALLIDUM: CPT | Performed by: STUDENT IN AN ORGANIZED HEALTH CARE EDUCATION/TRAINING PROGRAM

## 2024-06-02 PROCEDURE — 370N000003 HC ANESTHESIA WARD SERVICE: Performed by: ANESTHESIOLOGY

## 2024-06-02 RX ORDER — LOPERAMIDE HCL 2 MG
4 CAPSULE ORAL
Status: DISCONTINUED | OUTPATIENT
Start: 2024-06-02 | End: 2024-06-03 | Stop reason: HOSPADM

## 2024-06-02 RX ORDER — BISACODYL 10 MG
10 SUPPOSITORY, RECTAL RECTAL DAILY PRN
Status: DISCONTINUED | OUTPATIENT
Start: 2024-06-02 | End: 2024-06-03 | Stop reason: HOSPADM

## 2024-06-02 RX ORDER — OXYTOCIN/0.9 % SODIUM CHLORIDE 30/500 ML
100-340 PLASTIC BAG, INJECTION (ML) INTRAVENOUS CONTINUOUS PRN
Status: DISCONTINUED | OUTPATIENT
Start: 2024-06-02 | End: 2024-06-03 | Stop reason: HOSPADM

## 2024-06-02 RX ORDER — METHYLERGONOVINE MALEATE 0.2 MG/ML
200 INJECTION INTRAVENOUS
Status: DISCONTINUED | OUTPATIENT
Start: 2024-06-02 | End: 2024-06-02 | Stop reason: HOSPADM

## 2024-06-02 RX ORDER — OXYTOCIN 10 [USP'U]/ML
10 INJECTION, SOLUTION INTRAMUSCULAR; INTRAVENOUS
Status: DISCONTINUED | OUTPATIENT
Start: 2024-06-02 | End: 2024-06-02 | Stop reason: HOSPADM

## 2024-06-02 RX ORDER — NALOXONE HYDROCHLORIDE 0.4 MG/ML
0.2 INJECTION, SOLUTION INTRAMUSCULAR; INTRAVENOUS; SUBCUTANEOUS
Status: DISCONTINUED | OUTPATIENT
Start: 2024-06-02 | End: 2024-06-02 | Stop reason: HOSPADM

## 2024-06-02 RX ORDER — MISOPROSTOL 200 UG/1
400 TABLET ORAL
Status: DISCONTINUED | OUTPATIENT
Start: 2024-06-02 | End: 2024-06-03 | Stop reason: HOSPADM

## 2024-06-02 RX ORDER — ONDANSETRON 4 MG/1
4 TABLET, ORALLY DISINTEGRATING ORAL EVERY 6 HOURS PRN
Status: DISCONTINUED | OUTPATIENT
Start: 2024-06-02 | End: 2024-06-02 | Stop reason: HOSPADM

## 2024-06-02 RX ORDER — METOCLOPRAMIDE 10 MG/1
10 TABLET ORAL EVERY 6 HOURS PRN
Status: DISCONTINUED | OUTPATIENT
Start: 2024-06-02 | End: 2024-06-02 | Stop reason: HOSPADM

## 2024-06-02 RX ORDER — MISOPROSTOL 200 UG/1
800 TABLET ORAL
Status: DISCONTINUED | OUTPATIENT
Start: 2024-06-02 | End: 2024-06-03 | Stop reason: HOSPADM

## 2024-06-02 RX ORDER — HYDROCORTISONE 25 MG/G
CREAM TOPICAL 3 TIMES DAILY PRN
Status: DISCONTINUED | OUTPATIENT
Start: 2024-06-02 | End: 2024-06-03 | Stop reason: HOSPADM

## 2024-06-02 RX ORDER — OXYTOCIN/0.9 % SODIUM CHLORIDE 30/500 ML
1-24 PLASTIC BAG, INJECTION (ML) INTRAVENOUS CONTINUOUS
Status: DISCONTINUED | OUTPATIENT
Start: 2024-06-02 | End: 2024-06-02 | Stop reason: HOSPADM

## 2024-06-02 RX ORDER — FENTANYL CITRATE-0.9 % NACL/PF 10 MCG/ML
100 PLASTIC BAG, INJECTION (ML) INTRAVENOUS EVERY 5 MIN PRN
Status: DISCONTINUED | OUTPATIENT
Start: 2024-06-02 | End: 2024-06-02 | Stop reason: HOSPADM

## 2024-06-02 RX ORDER — MODIFIED LANOLIN
OINTMENT (GRAM) TOPICAL
Status: DISCONTINUED | OUTPATIENT
Start: 2024-06-02 | End: 2024-06-03 | Stop reason: HOSPADM

## 2024-06-02 RX ORDER — OXYTOCIN 10 [USP'U]/ML
10 INJECTION, SOLUTION INTRAMUSCULAR; INTRAVENOUS
Status: DISCONTINUED | OUTPATIENT
Start: 2024-06-02 | End: 2024-06-03 | Stop reason: HOSPADM

## 2024-06-02 RX ORDER — CARBOPROST TROMETHAMINE 250 UG/ML
250 INJECTION, SOLUTION INTRAMUSCULAR
Status: DISCONTINUED | OUTPATIENT
Start: 2024-06-02 | End: 2024-06-02 | Stop reason: HOSPADM

## 2024-06-02 RX ORDER — LOPERAMIDE HCL 2 MG
2 CAPSULE ORAL
Status: DISCONTINUED | OUTPATIENT
Start: 2024-06-02 | End: 2024-06-03 | Stop reason: HOSPADM

## 2024-06-02 RX ORDER — NALOXONE HYDROCHLORIDE 0.4 MG/ML
0.4 INJECTION, SOLUTION INTRAMUSCULAR; INTRAVENOUS; SUBCUTANEOUS
Status: DISCONTINUED | OUTPATIENT
Start: 2024-06-02 | End: 2024-06-02 | Stop reason: HOSPADM

## 2024-06-02 RX ORDER — BUPIVACAINE HYDROCHLORIDE 2.5 MG/ML
INJECTION, SOLUTION EPIDURAL; INFILTRATION; INTRACAUDAL
Status: COMPLETED | OUTPATIENT
Start: 2024-06-02 | End: 2024-06-02

## 2024-06-02 RX ORDER — TRANEXAMIC ACID 10 MG/ML
1 INJECTION, SOLUTION INTRAVENOUS EVERY 30 MIN PRN
Status: DISCONTINUED | OUTPATIENT
Start: 2024-06-02 | End: 2024-06-03 | Stop reason: HOSPADM

## 2024-06-02 RX ORDER — PROCHLORPERAZINE 25 MG
25 SUPPOSITORY, RECTAL RECTAL EVERY 12 HOURS PRN
Status: DISCONTINUED | OUTPATIENT
Start: 2024-06-02 | End: 2024-06-02 | Stop reason: HOSPADM

## 2024-06-02 RX ORDER — LIDOCAINE 40 MG/G
CREAM TOPICAL
Status: DISCONTINUED | OUTPATIENT
Start: 2024-06-02 | End: 2024-06-02 | Stop reason: HOSPADM

## 2024-06-02 RX ORDER — FENTANYL/ROPIVACAINE/NS/PF 2MCG/ML-.1
PLASTIC BAG, INJECTION (ML) EPIDURAL
Status: DISCONTINUED | OUTPATIENT
Start: 2024-06-02 | End: 2024-06-02 | Stop reason: HOSPADM

## 2024-06-02 RX ORDER — OXYTOCIN/0.9 % SODIUM CHLORIDE 30/500 ML
340 PLASTIC BAG, INJECTION (ML) INTRAVENOUS CONTINUOUS PRN
Status: DISCONTINUED | OUTPATIENT
Start: 2024-06-02 | End: 2024-06-03 | Stop reason: HOSPADM

## 2024-06-02 RX ORDER — OXYTOCIN/0.9 % SODIUM CHLORIDE 30/500 ML
340 PLASTIC BAG, INJECTION (ML) INTRAVENOUS CONTINUOUS PRN
Status: DISCONTINUED | OUTPATIENT
Start: 2024-06-02 | End: 2024-06-02 | Stop reason: HOSPADM

## 2024-06-02 RX ORDER — MISOPROSTOL 200 UG/1
400 TABLET ORAL
Status: DISCONTINUED | OUTPATIENT
Start: 2024-06-02 | End: 2024-06-02 | Stop reason: HOSPADM

## 2024-06-02 RX ORDER — SODIUM CHLORIDE, SODIUM LACTATE, POTASSIUM CHLORIDE, CALCIUM CHLORIDE 600; 310; 30; 20 MG/100ML; MG/100ML; MG/100ML; MG/100ML
INJECTION, SOLUTION INTRAVENOUS CONTINUOUS PRN
Status: DISCONTINUED | OUTPATIENT
Start: 2024-06-02 | End: 2024-06-02 | Stop reason: HOSPADM

## 2024-06-02 RX ORDER — LOPERAMIDE HCL 2 MG
4 CAPSULE ORAL
Status: DISCONTINUED | OUTPATIENT
Start: 2024-06-02 | End: 2024-06-02 | Stop reason: HOSPADM

## 2024-06-02 RX ORDER — DOCUSATE SODIUM 100 MG/1
100 CAPSULE, LIQUID FILLED ORAL DAILY
Status: DISCONTINUED | OUTPATIENT
Start: 2024-06-02 | End: 2024-06-03 | Stop reason: HOSPADM

## 2024-06-02 RX ORDER — FENTANYL CITRATE 50 UG/ML
50 INJECTION, SOLUTION INTRAMUSCULAR; INTRAVENOUS EVERY 30 MIN PRN
Status: DISCONTINUED | OUTPATIENT
Start: 2024-06-02 | End: 2024-06-02 | Stop reason: HOSPADM

## 2024-06-02 RX ORDER — KETOROLAC TROMETHAMINE 30 MG/ML
30 INJECTION, SOLUTION INTRAMUSCULAR; INTRAVENOUS
Status: DISCONTINUED | OUTPATIENT
Start: 2024-06-02 | End: 2024-06-03 | Stop reason: HOSPADM

## 2024-06-02 RX ORDER — METOCLOPRAMIDE HYDROCHLORIDE 5 MG/ML
10 INJECTION INTRAMUSCULAR; INTRAVENOUS EVERY 6 HOURS PRN
Status: DISCONTINUED | OUTPATIENT
Start: 2024-06-02 | End: 2024-06-02 | Stop reason: HOSPADM

## 2024-06-02 RX ORDER — TRANEXAMIC ACID 10 MG/ML
1 INJECTION, SOLUTION INTRAVENOUS EVERY 30 MIN PRN
Status: DISCONTINUED | OUTPATIENT
Start: 2024-06-02 | End: 2024-06-02 | Stop reason: HOSPADM

## 2024-06-02 RX ORDER — ONDANSETRON 2 MG/ML
4 INJECTION INTRAMUSCULAR; INTRAVENOUS EVERY 6 HOURS PRN
Status: DISCONTINUED | OUTPATIENT
Start: 2024-06-02 | End: 2024-06-02 | Stop reason: HOSPADM

## 2024-06-02 RX ORDER — NALBUPHINE HYDROCHLORIDE 20 MG/ML
2.5-5 INJECTION, SOLUTION INTRAMUSCULAR; INTRAVENOUS; SUBCUTANEOUS EVERY 6 HOURS PRN
Status: DISCONTINUED | OUTPATIENT
Start: 2024-06-02 | End: 2024-06-03 | Stop reason: HOSPADM

## 2024-06-02 RX ORDER — PROCHLORPERAZINE MALEATE 10 MG
10 TABLET ORAL EVERY 6 HOURS PRN
Status: DISCONTINUED | OUTPATIENT
Start: 2024-06-02 | End: 2024-06-02 | Stop reason: HOSPADM

## 2024-06-02 RX ORDER — IBUPROFEN 800 MG/1
800 TABLET, FILM COATED ORAL EVERY 6 HOURS PRN
Status: DISCONTINUED | OUTPATIENT
Start: 2024-06-02 | End: 2024-06-03 | Stop reason: HOSPADM

## 2024-06-02 RX ORDER — MISOPROSTOL 200 UG/1
800 TABLET ORAL
Status: DISCONTINUED | OUTPATIENT
Start: 2024-06-02 | End: 2024-06-02 | Stop reason: HOSPADM

## 2024-06-02 RX ORDER — ACETAMINOPHEN 325 MG/1
650 TABLET ORAL EVERY 4 HOURS PRN
Status: DISCONTINUED | OUTPATIENT
Start: 2024-06-02 | End: 2024-06-03 | Stop reason: HOSPADM

## 2024-06-02 RX ORDER — IBUPROFEN 800 MG/1
800 TABLET, FILM COATED ORAL
Status: DISCONTINUED | OUTPATIENT
Start: 2024-06-02 | End: 2024-06-03 | Stop reason: HOSPADM

## 2024-06-02 RX ORDER — TERBUTALINE SULFATE 1 MG/ML
0.25 INJECTION, SOLUTION SUBCUTANEOUS
Status: DISCONTINUED | OUTPATIENT
Start: 2024-06-02 | End: 2024-06-02 | Stop reason: HOSPADM

## 2024-06-02 RX ORDER — CARBOPROST TROMETHAMINE 250 UG/ML
250 INJECTION, SOLUTION INTRAMUSCULAR
Status: DISCONTINUED | OUTPATIENT
Start: 2024-06-02 | End: 2024-06-03 | Stop reason: HOSPADM

## 2024-06-02 RX ORDER — LOPERAMIDE HCL 2 MG
2 CAPSULE ORAL
Status: DISCONTINUED | OUTPATIENT
Start: 2024-06-02 | End: 2024-06-02 | Stop reason: HOSPADM

## 2024-06-02 RX ORDER — METHYLERGONOVINE MALEATE 0.2 MG/ML
200 INJECTION INTRAVENOUS
Status: DISCONTINUED | OUTPATIENT
Start: 2024-06-02 | End: 2024-06-03 | Stop reason: HOSPADM

## 2024-06-02 RX ORDER — CITRIC ACID/SODIUM CITRATE 334-500MG
30 SOLUTION, ORAL ORAL
Status: DISCONTINUED | OUTPATIENT
Start: 2024-06-02 | End: 2024-06-02 | Stop reason: HOSPADM

## 2024-06-02 RX ADMIN — Medication 1 MILLI-UNITS/MIN: at 10:13

## 2024-06-02 RX ADMIN — Medication: at 02:12

## 2024-06-02 RX ADMIN — IBUPROFEN 800 MG: 800 TABLET ORAL at 19:41

## 2024-06-02 RX ADMIN — BUPIVACAINE HYDROCHLORIDE 8 ML: 2.5 INJECTION, SOLUTION EPIDURAL; INFILTRATION; INTRACAUDAL at 02:00

## 2024-06-02 RX ADMIN — SODIUM CHLORIDE, POTASSIUM CHLORIDE, SODIUM LACTATE AND CALCIUM CHLORIDE 1000 ML: 600; 310; 30; 20 INJECTION, SOLUTION INTRAVENOUS at 01:05

## 2024-06-02 RX ADMIN — DOCUSATE SODIUM 100 MG: 100 CAPSULE, LIQUID FILLED ORAL at 13:39

## 2024-06-02 ASSESSMENT — ACTIVITIES OF DAILY LIVING (ADL)
ADLS_ACUITY_SCORE: 19
ADLS_ACUITY_SCORE: 18
ADLS_ACUITY_SCORE: 18
ADLS_ACUITY_SCORE: 19
ADLS_ACUITY_SCORE: 18
ADLS_ACUITY_SCORE: 19
ADLS_ACUITY_SCORE: 18
ADLS_ACUITY_SCORE: 19
ADLS_ACUITY_SCORE: 18
ADLS_ACUITY_SCORE: 19
ADLS_ACUITY_SCORE: 19
ADLS_ACUITY_SCORE: 18
ADLS_ACUITY_SCORE: 19
ADLS_ACUITY_SCORE: 18
ADLS_ACUITY_SCORE: 18
ADLS_ACUITY_SCORE: 19

## 2024-06-02 NOTE — PLAN OF CARE
Problem: Postpartum (Vaginal Delivery)  Goal: Effective Urinary Elimination  Outcome: Progressing     Problem: Postpartum (Vaginal Delivery)  Goal: Optimal Pain Control and Function  Outcome: Progressing     Problem: Postpartum (Vaginal Delivery)  Goal: Hemostasis  Outcome: Progressing     VSS. Pt denied pain from 5180-4756. Bleeding is scant. Fundus firm and midline. Pt is bonding well with baby and breastfeeding when appropriate. Pt has up moving around the room and voiding independently.

## 2024-06-02 NOTE — PROGRESS NOTES
Data: [facility] 20[facility] 11:40 PM.  Reason for maternal/fetal assessment is uterine contractions. Patient reports contractions throughout the day, increasing in strength and frequency, contractions were every 5 mins for 2 hours prior to coming to the hospital. Patient denies leaking of vaginal fluid/rupture of membranes, vaginal bleeding, nausea, vomiting, headache, visual disturbances, epigastric or RUQ pain, significant edema. Patient reports fetal movement is normal. Patient is a 38w3d . Prenatal record reviewed. Pregnancy has been uncomplicated. Support person is present.     Fetal HR baseline was 145, variability is minimal (detectable, amplitude less than or equal to 5 bpm). Accelerations: increase 15 bpm above baseline lasting 15 seconds. Decelerations:  . Uterine assessment is   during contractions and   at rest. Cervix 4 cm dilated and 80% effaced. Fetal station -1. Fetal presentation   per cervical exam. Membranes: intact.    Vital signs wnl. Patient reports pain and is coping.     Action: Verbal consent for EFM. Triage assessment completed. Patient does not meet criteria for early labor discharge.     Response: Patient verbalized agreement with plan. Will contact Dr Álvarez with update and for further orders.

## 2024-06-02 NOTE — PROVIDER NOTIFICATION
Updated Dr. Zendejas on patient's cervical exam. Patient wanted to know if there was a time we would consider starting pitocin. Discussed with provider that patient's exam had not changed much in 3 hours and appears contractions have spaced out, plan is to start low dose pitocin at this time.

## 2024-06-02 NOTE — ANESTHESIA PROCEDURE NOTES
"Epidural catheter Procedure Note    Pre-Procedure   Staff -        Anesthesiologist:  Kevin Izquierdo MD       Performed By: anesthesiologist       Location: OB       Procedure Start/Stop Times: 6/2/2024 2:00 AM and 6/2/2024 2:16 AM       Pre-Anesthestic Checklist: patient identified, IV checked, risks and benefits discussed, informed consent, monitors and equipment checked, pre-op evaluation, at physician/surgeon's request and post-op pain management  Timeout:       Correct Patient: Yes        Correct Procedure: Yes        Correct Site: Yes        Correct Position: Yes   Procedure Documentation  Procedure: epidural catheter       Patient Position: sitting       Skin prep: Chloraprep       Local skin infiltrated with 3 mL of 1% lidocaine.        Insertion Site: L3-4. (midline approach).       Technique: LORT saline        Needle Type: [employer]       Needle Gauge: 18.        Needle Length (Inches): 3.5        Catheter: 20 G.          Catheter threaded easily.         4 cm epidural space.           # of attempts: 1 and  # of redirects:  1    Assessment/Narrative         Paresthesias: No.       Test dose of 3 mL lidocaine 1.5% w/ 1:200,000 epinephrine at.         Test dose negative, 3 minutes after injection, for signs of intravascular, subdural, or intrathecal injection.       Insertion/Infusion Method: LORT saline       Aspiration negative for Heme or CSF via Epidural Catheter.    Medication(s) Administered   0.25% Bupivacaine PF (Epidural) - EPIDURAL   8 mL - 6/2/2024 2:00:00 AM  Medication Administration Time: 6/2/2024 2:00 AM      FOR [facility] ([facility] ONLY:   Pain Team Contact information: please page the Pain Team Via [employer]. Search \"Pain\". During daytime hours, please page the attending first. At night please page the resident first.      "

## 2024-06-02 NOTE — PROGRESS NOTES
OB NOTE    Protracted active phase labor  Cx:9/-1  Ctx have spaced  FHT:CAT 1  Plan low dose Pit  Reviewed birth plan with patient  Highly motivated for vaginal birth    Micky Zendejas MD

## 2024-06-02 NOTE — H&P
Date: 2024  Time: 6:19 AM    Admission H&P  AliannKelly Owusu,  1990, MRN [id number]    PCP: No Ref-Primary, Physician, None  Primary OB: Leonardo   Code status:  Full Code              Chief Complaint: Labor/TOLAC     OBSTETRICAL / DATING HISTORY:  Estimated Date of Delivery: Estimated Date of Delivery: 2024  Gestational Age: 38w3d    OB History    Para Term  AB Living   3 2 2 0 0 2   SAB IAB Ectopic Multiple Live Births   0 0 0 0 2      # Outcome Date GA Lbr Sam/2nd Weight Sex Type Anes PTL Lv   3 Current            2 Term 22 38w5d 13:45 / 00:24 3.21 kg (7 lb 1.2 oz) F Vag-Vacuum EPI N CHRISTIAN      Name: DINESH OWUSU,FEMALE-ALIANNY      Apgar1: 8  Apgar5: 9   1 Term 18 40w5d  3.3 kg (7 lb 4.4 oz) M CS-LTranv   CHRISTIAN      Complications: Fetal Intolerance, Preeclampsia/Hypertension, Arrest of descent, delivered, current hospitalization      Name: DINESH OWUSU,BABY1 ALIANNY      Apgar1: 7  Apgar5: 6       HPI:    Alianny NEENA Owusu is a 33 year old year old at 38w3d weeks. She presented to L&D for painful contractions.  She was 4 cm on admission, breathing hrough contractions and requesting pain control.  She has now received an epidural and is resting comfortably in bed.    OB Hx: 1  for arrest of descent followed by successful     Medical History  Past Medical History:   Diagnosis Date     (vaginal birth after ) 2022       Surgical History  Past Surgical History:   Procedure Laterality Date     SECTION N/A 2018    Procedure:  SECTION;;  Surgeon: Sandra Preciado MD;  Location:  L+D       Social History  Social History     Socioeconomic History    Marital status:      Spouse name: Not on file    Number of children: Not on file    Years of education: Not on file    Highest education level: Not on file   Occupational History    Not on file   Tobacco Use     "Smoking status: Never    Smokeless tobacco: Never   Substance and Sexual Activity    Alcohol use: No    Drug use: No    Sexual activity: Yes     Partners: Male   Other Topics Concern    Not on file   Social History Narrative    Not on file     Social Determinants of Health     Financial Resource Strain: Not on file   Food Insecurity: Not on file   Transportation Needs: Not on file   Physical Activity: Not on file   Stress: Not on file   Social Connections: Not on file   Interpersonal Safety: Not on file   Housing Stability: Not on file       Allergies   Allergen Reactions    Latex        Medications Prior to Admission   Medication Sig Dispense Refill Last Dose    Prenatal Vit-Fe Fumarate-FA (PRENATAL MULTIVITAMIN PLUS IRON) 27-0.8 MG TABS per tablet Take 1 tablet by mouth daily 100 tablet 3 Past Week    acetaminophen (TYLENOL) 325 MG tablet Take 2 tablets (650 mg) by mouth every 4 hours as needed for mild pain or pain 100 tablet 0     ferrous sulfate (IRON) 325 (65 Fe) MG tablet Take 1 tablet (325 mg) by mouth daily (with breakfast) 90 tablet 2     ibuprofen (ADVIL/MOTRIN) 600 MG tablet Take 1 tablet (600 mg) by mouth every 6 hours as needed 40 tablet 0        Review of Systems:  Pertinent items are noted in HPI.    Physical Exam:  Temp:  [98.1  F (36.7  C)-98.5  F (36.9  C)] 98.3  F (36.8  C)  Resp:  [15-18] (P) 16  BP: (102-139)/(54-95) 125/61  SpO2:  [94 %-100 %] 96 %    /61   Temp 98.3  F (36.8  C) (Oral)   Resp (P) 16   Ht 1.575 m (5' 2\")   Wt 86.2 kg (190 lb)   SpO2 96%   BMI 34.75 kg/m      General: NAD  CV: RRR  Lungs: clear  Abdomen: soft, gravid    SVE 7/80/BBOW, AROM clear    FHT baseline 140's, mod v, +a, non-recurrent occasional small variable decelerations  Valentine: ctx q 3    Pertinent Labs  Admission on 06/01/2024   Component Date Value Ref Range Status    Hemoglobin 06/02/2024 10.9 (L)  11.7 - 15.7 g/dL Final    ABO/RH(D) 06/02/2024 B POS   Final    Antibody Screen 06/02/2024 Negative  " Negative Final    SPECIMEN EXPIRATION DATE 2024 [id number]   Final   Lab Requisition on 2024   Component Date Value Ref Range Status    Group B Strep PCR 2024 Negative  Negative Final    Presumed negative for Streptococcus agalactiae (Group B Streptococcus) or the number of organisms may be below the limit of detection of the assay.   Lab Requisition on 2024   Component Date Value Ref Range Status    Rapid Plasma Reagin 2024 Nonreactive  Nonreactive Final   Lab Requisition on 2024   Component Date Value Ref Range Status    Hepatitis C Antibody 2024 Nonreactive  Nonreactive Final    A nonreactive screening test result does not exclude the possibility of exposure to or infection with HCV. Nonreactive screening test results in individuals with prior exposure to HCV may be due to antibody levels below the limit of detection of this assay or lack of reactivity to the HCV antigens used in this assay. Patients with recent HCV infections (<3 months from time of exposure) may have false-negative HCV antibody results due to the time needed for seroconversion (average of 8 to 9 weeks).    Hemoglobin A1C 2024 5.3  <5.7 % Final    Normal <5.7%   Prediabetes 5.7-6.4%    Diabetes 6.5% or higher     Note: Adopted from ADA consensus guidelines.   Lab Requisition on 10/12/2023   Component Date Value Ref Range Status    hCG Quantitative 10/12/2023 1,362 (H)  <5 mIU/mL Final    Adult: 0-5 mIU/mL for healthy non-pregnant person  Neonates: Should be within normal ranges by 2 days after birth       GBS neg    Pertinent Radiology:       Impression/Plan:  1. IUP at 38w3d weeks, labor/TOLAC  -Reviewed approximately 1% risk of uterine rupture with TOLAC, risk increases to 1.9% with use of pitocin.  T&S and CBC ordered on admission, IV in place, plan cEFW with TOLAC  -Patient continues to desire TOLAC, she has had 1 successful     Provider: Lori Álvarez MD    Date: 2024  Time:  6:19 AM    Sherlyn CHAUDHARY MI Yogesh Owusu,  1990, MRN [id number]

## 2024-06-02 NOTE — L&D DELIVERY NOTE
Delivery Summary    Sherlyn Owusu MRN# [id number]   Age: 33 year old YOB: 1990     ASSESSMENT & PLAN: IUP at 39 weeeks, ASVD,        Kate Martinez-Sherlyn [[id number]]      Labor Event Times      Dilation complete date: 24 Complete time: 11:27 AM   Start pushing date/time: 2024 1135   Decision date/time (emergent ): 2024 1130          Labor Length      2nd Stage (hrs): 0 (min): 31   3rd Stage (hrs): 0 (min): 4          Labor Events     labor?: No   steroids: None  Labor Type: Spontaneous  Predominate monitoring during 1st stage: continuous electronic fetal monitoring       Rupture date/time: 24 0603   Rupture type: Artificial Rupture of Membranes  Fluid color: Clear  Fluid odor: Normal     Augmentation: Oxytocin  Indications for augmentation: Ineffective Contraction Pattern       Delivery/Placenta Date and Time      Delivery Date: 24 Delivery Time: 11:58 AM   Placenta Date/Time: 2024 12:02 PM  Oxytocin given at the time of delivery: after delivery of placenta  Delivering clinician: Micky Zendejas MD   Other personnel present at delivery:  Provider Role   Chata Knott RN Delivery Nurse   Eileen Martinez RN Registered Nurse             Vaginal Counts       Initial count performed by 2 team members:  Two Team Members   trevin zendejas         Needles Suture Needles Sponges (RETIRED) Instruments   Initial counts 0 0 0    Added to count       Relief counts       Final counts 0 0 0            Placed during labor Accounted for at the end of labor   FSE NA NA   IUPC NA NA   Cervidil NA NA                  Final count performed by 2 team members:  Two Team Members   trevin zendejas      Final count correct?: Yes  Pre-Birth Team Brief: Complete  Post-Birth Team Debrief: Complete       Apgars    Living status: Living   1 Minute 5 Minute 10 Minute 15 Minute 20 Minute   Skin color: 1  1      "  Heart rate: 2  2       Reflex irritability: 1  2       Muscle tone: 1  2       Respiratory effort: 1  2       Total: 6  9       Apgars assigned by: ALDAIR THOMAS       Cord      Vessels: 3 Vessels    Cord Complications: None               Cord Blood Disposition: Discard    Gases Sent?: No    Delayed cord clamping?: Yes    Cord Clamping Delay (seconds): 31-60 seconds    Stem cell collection?: No           [place] Resuscitation    Methods: None  [place] Care at Delivery: Called to delivery after baby was delivered and had poor tone and respiratory effort.  Infant brought to the warmer and stimulated with improved respirations and tone.  Infant with clear and equal breath sounds bilaterally and infant with centrally pink color.  [place] [facility] [place] transition.  Delivery team dismissed after 2 min, please call with further concerns.  Edyta MCCRARY, CNP 2024 12:06 PM    Output in Delivery Room: Stool        Measurements      Weight: 7 lb 4.8 oz Length: 1' 9\"     Head circumference: 34.9 cm    Output in delivery room: Stool       Skin to Skin and Feeding Plan      Skin to skin initiation date/time: 1841    Skin to skin with: Mother  Skin to skin end date/time: 1841           Labor Events and Shoulder Dystocia    Fetal Tracing Prior to Delivery: Category 1  Shoulder dystocia present?: Neg       Delivery (Maternal) (Provider to Complete) ([id number])    Episiotomy: None  Perineal lacerations: None    Est. blood loss (mL): 100  Repair suture: None  Number of repair packets: 0  Genital tract inspection done: Pos       Blood Loss  Mother: Sherlyn Martinez NO MI #[id number]     Start of Mother's Information      Delivery Blood Loss  24 2358 - 24 1612      EBL (mL) Hospital Encounter 100 mL    Postpartum QBL (mL) Hospital Encounter 191 mL    Total  291 mL               End of Mother's Information  Mother: Sherlyn Martinez NO MI #[id number]      "           Delivery - Provider to Complete ([id number])    Delivering clinician: Micky Zendejas MD  Delivery Type (Choose the 1 that will go to the Birth History): Vaginal, Spontaneous                         Other personnel:  Provider Role   Chata Knott, ROCIO Delivery Nurse   Eileen Martinez, RN Registered Nurse                    Placenta    Date/Time: 6/2/2024 12:02 PM  Removal: Spontaneous  Disposition: Hospital disposal             Anesthesia    Method: Epidural  Cervical dilation at placement: 4-7                    Presentation and Position    Presentation: Vertex    Position: Middle Occiput Anterior                     Micky Zendejas MD

## 2024-06-02 NOTE — PLAN OF CARE
Problem: Labor  Goal: Stable Fetal Wellbeing  Outcome: Progressing     Problem: Labor  Goal: Effective Progression to Delivery  Outcome: Progressing     Problem: Labor  Goal: Absence of Infection Signs and Symptoms  Outcome: Progressing   Goal Outcome Evaluation:

## 2024-06-02 NOTE — ANESTHESIA PREPROCEDURE EVALUATION
Anesthesia Pre-Procedure Evaluation    Patient: Sherlyn Owusu   MRN: [id number] : 1990        Procedure :           Past Medical History:   Diagnosis Date     (vaginal birth after ) 2022      Past Surgical History:   Procedure Laterality Date     SECTION N/A 2018    Procedure:  SECTION;;  Surgeon: Sandra Preciado MD;  Location: [facility]      Allergies   Allergen Reactions    Latex       Social History     Tobacco Use    Smoking status: Never    Smokeless tobacco: Never   Substance Use Topics    Alcohol use: No      Wt Readings from Last 1 Encounters:   24 86.2 kg (190 lb)        Anesthesia Evaluation   Pt has had prior anesthetic.     No history of anesthetic complications       ROS/MED HX  ENT/Pulmonary:  - neg pulmonary ROS     Neurologic:  - neg neurologic ROS     Cardiovascular:  - neg cardiovascular ROS     METS/Exercise Tolerance:     Hematologic:  - neg hematologic  ROS     Musculoskeletal:       GI/Hepatic:  - neg GI/hepatic ROS     Renal/Genitourinary:       Endo:     (+)               Obesity,       Psychiatric/Substance Use:  - neg psychiatric ROS     Infectious Disease:       Malignancy:       Other:      (+)  , ,previous          Physical Exam    Airway        Mallampati: I       Respiratory Devices and Support         Dental           Cardiovascular   cardiovascular exam normal          Pulmonary   pulmonary exam normal                OUTSIDE LABS:  CBC:   Lab Results   Component Value Date    WBC 9.7 2022    WBC 22.1 (H) 08/15/2018    HGB 10.9 (L) 2024    HGB 8.0 (L) 2022    HCT 33.6 (L) 2022    HCT 24.2 (L) 2018     2022     (L) 08/15/2018     BMP:   Lab Results   Component Value Date     08/15/2018     2018    POTASSIUM 4.7 08/15/2018    POTASSIUM 3.8 2018    CHLORIDE 106 08/15/2018    CHLORIDE 103 2018    CO2 26 08/15/2018    CO2  "21 08/13/2018    BUN 13 08/15/2018    BUN 16 08/13/2018    CR 1.21 (H) 08/15/2018    CR 1.33 (H) 08/15/2018     (H) 08/15/2018    GLC 82 08/13/2018     COAGS: No results found for: \"PTT\", \"INR\", \"FIBR\"  POC: No results found for: \"BGM\", \"HCG\", \"HCGS\"  HEPATIC:   Lab Results   Component Value Date    ALT 33 08/14/2018    AST 45 08/14/2018     OTHER:   Lab Results   Component Value Date    LACT 1.7 08/15/2018    A1C 5.3 03/04/2024    OANH 7.7 (L) 08/15/2018    MAG 7.6 (HH) 08/14/2018       Anesthesia Plan    ASA Status:  2       Anesthesia Type: Epidural.              Consents    Anesthesia Plan(s) and associated risks, benefits, and realistic alternatives discussed. Questions answered and patient/representative(s) expressed understanding.     - Discussed:     - Discussed with:  Patient, Spouse            Postoperative Care            Comments:           neg OB ROS.      Kevin Izquierdo MD    I have reviewed the pertinent notes and labs in the chart from the past 30 days and (re)examined the patient.  Any updates or changes from those notes are reflected in this note.                  "

## 2024-06-03 VITALS
DIASTOLIC BLOOD PRESSURE: 68 MMHG | OXYGEN SATURATION: 98 % | HEART RATE: 80 BPM | HEIGHT: 62 IN | WEIGHT: 190 LBS | SYSTOLIC BLOOD PRESSURE: 117 MMHG | BODY MASS INDEX: 34.96 KG/M2 | TEMPERATURE: 98.2 F | RESPIRATION RATE: 16 BRPM

## 2024-06-03 PROCEDURE — 250N000013 HC RX MED GY IP 250 OP 250 PS 637: Performed by: OBSTETRICS & GYNECOLOGY

## 2024-06-03 RX ADMIN — DOCUSATE SODIUM 100 MG: 100 CAPSULE, LIQUID FILLED ORAL at 07:45

## 2024-06-03 RX ADMIN — IBUPROFEN 800 MG: 800 TABLET ORAL at 07:45

## 2024-06-03 ASSESSMENT — ACTIVITIES OF DAILY LIVING (ADL)
ADLS_ACUITY_SCORE: 18

## 2024-06-03 NOTE — DISCHARGE SUMMARY
"    HISTORY OF PRESENT ILLNESS AND HOSPITAL COURSE: This is a 33 year old,  female who underwent Normal spontaneous vaginal delivery- . Post partum course was unremarkable.  On the day that she was discharged, vital signs were stable. Her pain was controlled, she was tolerating diet. She was voiding and passing gas.     LABS:  Lab Results   Component Value Date    HGB 10.9 (L) 2024       EXAM:  Blood pressure 117/68, pulse 80, temperature 98.2  F (36.8  C), temperature source Oral, resp. rate 16, height 1.575 m (5' 2\"), weight 86.2 kg (190 lb), SpO2 98%, unknown if currently breastfeeding.  General: NAD  Abdomen: Soft, non-tender  Uterine fundus: Firm, non-tender  Extremities: no pain, no edema    DISPOSITION:  Home    CONDITION AT DISCHARGE:  Good/Stable    Discharge Medications: see AVS    DISCHARGE PLAN:   - Follow up with  MD, in 4-6 weeks, unless indicated sooner  - Take medication as prescribed  - Physical activity: As tolerated, no heavy lifting. Pelvic rest.  - Diet:  Regular  - Medication:  Please see MAR  - Warning signs discussed with patient about when to call the clinic/hospital  - All questions and concerns were answered for the patient prior to discharge.     Sujatha Luther MD FACOG  [facility]  [phone]        I saw the patient on the date of discharge  Total time spent for discharge on date of discharge: 20 minutes=  "

## 2024-06-03 NOTE — PLAN OF CARE
Problem: Postpartum (Vaginal Delivery)  Goal: Optimal Pain Control and Function  Outcome: Met     Problem: Postpartum (Vaginal Delivery)  Goal: Effective Urinary Elimination  Outcome: Met    Pt with stable VS including BP. Voiding without difficulty, bleeding light and firm mid U/U. Doing well latching Baby and BF ad [place],  other two children. Reports relief of intermittent cramping pain with Ibuprofen x1 overnight. Cont to monitor VS and pain and plan for discharge home when appropriate.

## 2024-06-03 NOTE — DISCHARGE INSTRUCTIONS
Warning Signs after Having a Baby    Keep this paper on your fridge or somewhere else where you can see it.    Call your provider if you have any of these symptoms up to 12 weeks after having your baby.    Thoughts of hurting yourself or your baby  Pain in your chest or trouble breathing  Severe headache not helped by pain medicine  Eyesight concerns (blurry vision, seeing spots or flashes of light, other changes to eyesight)  Fainting, shaking or other signs of a seizure    Call 9-1-1 if you feel that it is an emergency.     The symptoms below can happen to anyone after giving birth. They can be very serious. Call your provider if you have any of these warning signs.    My provider s phone number: _______________________    Losing too much blood (hemorrhage)    Call your provider if you soak through a pad in less than an hour or pass blood clots bigger than a golf ball. These may be signs that you are bleeding too much.    Blood clots in the legs or lungs    After you give birth, your body naturally clots its blood to help prevent blood loss. Sometimes this increased clotting can happen in other areas of the body, like the legs or lungs. This can block your blood flow and be very dangerous.     Call your provider if you:  Have a red, swollen spot on the back of your leg that is warm or painful when you touch it.   Are coughing up blood.     Infection    Call your provider if you have any of these symptoms:  Fever of 100.4 F (38 C) or higher.  Pain or redness around your stitches if you had an incision.   Any yellow, white, or green fluid coming from places where you had stitches or surgery.    Mood Problems (postpartum depression)    Many people feel sad or have mood changes after having a baby. But for some people, these mood swings are worse.     Call your provider right away if you feel so anxious or nervous that you can't care for yourself or your baby.    Preeclampsia (high blood pressure)    Even if you  didn't have high blood pressure when you were pregnant, you are at risk for the high blood pressure disease called preeclampsia. This risk can last up to 12 weeks after giving birth.     Call your provider if you have:   Pain on your right side under your rib cage  Sudden swelling in the hands and face    Remember: You know your body. If something doesn't feel right, get medical help.     For informational purposes only. Not to replace the advice of your health care provider. Copyright 2020 [facility]. All rights reserved. Clinically reviewed by Anika Easton, RNC-OB, MSN. [employer] [id number] - Rev 02/23.

## 2024-06-03 NOTE — ANESTHESIA POSTPROCEDURE EVALUATION
Patient: Sherlyn Owusu    Procedure: * No procedures listed *       Anesthesia Type:  Epidural    Note:  Disposition: Inpatient   Postop Pain Control: Uneventful            Sign Out: Well controlled pain   PONV: No   Neuro/Psych: Uneventful            Sign Out: Acceptable/Baseline neuro status   Airway/Respiratory: Uneventful            Sign Out: Acceptable/Baseline resp. status   CV/Hemodynamics: Uneventful            Sign Out: Acceptable CV status; No obvious hypovolemia; No obvious fluid overload   Other NRE: NONE   DID A NON-ROUTINE EVENT OCCUR?            Last vitals:  Vitals:    06/02/24 1800 06/02/24 2210 06/03/24 0215   BP: 139/76 124/71 122/71   Pulse: 93 88 84   Resp: 16 16 16   Temp: 37.6  C (99.6  F) 36.8  C (98.2  F) 36.6  C (97.9  F)   SpO2:  95%        Electronically Signed By: Melquiades Nieto MD  Ave 3, 2024  5:59 AM

## 2024-06-03 NOTE — PLAN OF CARE
"  Problem: Adult Inpatient Plan of Care  Goal: Plan of Care Review  Description: The Plan of Care Review/Shift note should be completed every shift.  The Outcome Evaluation is a brief statement about your assessment that the patient is improving, declining, or no change.  This information will be displayed automatically on your shift  note.  Outcome: Adequate for Care Transition  Goal: Patient-Specific Goal (Individualized)  Description: You can add care plan individualizations to a care plan. Examples of Individualization might be:  \"Parent requests to be called daily at 9am for status\", \"I have a hard time hearing out of my right ear\", or \"Do not touch me to wake me up as it startles  me\".  Outcome: Adequate for Care Transition  Goal: Absence of Hospital-Acquired Illness or Injury  Outcome: Adequate for Care Transition  Goal: Optimal Comfort and Wellbeing  Outcome: Adequate for Care Transition  Goal: Readiness for Transition of Care  Outcome: Adequate for Care Transition     Problem: Postpartum (Vaginal Delivery)  Goal: Successful Parent Role Transition  Outcome: Adequate for Care Transition  Goal: Hemostasis  Outcome: Adequate for Care Transition  Goal: Absence of Infection Signs and Symptoms  Outcome: Adequate for Care Transition  Goal: Anesthesia/Sedation Recovery  Outcome: Adequate for Care Transition     "

## 2024-07-14 ENCOUNTER — HEALTH MAINTENANCE LETTER (OUTPATIENT)
Age: 34
End: 2024-07-14

## 2024-08-19 ENCOUNTER — LAB REQUISITION (OUTPATIENT)
Dept: LAB | Facility: CLINIC | Age: 34
End: 2024-08-19

## 2024-08-19 DIAGNOSIS — Z12.4 ENCOUNTER FOR SCREENING FOR MALIGNANT NEOPLASM OF CERVIX: ICD-10-CM

## 2024-08-19 DIAGNOSIS — Z30.430 ENCOUNTER FOR INSERTION OF INTRAUTERINE CONTRACEPTIVE DEVICE: ICD-10-CM

## 2024-08-19 PROCEDURE — 87491 CHLMYD TRACH DNA AMP PROBE: CPT | Performed by: OBSTETRICS & GYNECOLOGY

## 2024-08-19 PROCEDURE — G0145 SCR C/V CYTO,THINLAYER,RESCR: HCPCS | Performed by: OBSTETRICS & GYNECOLOGY

## 2024-08-19 PROCEDURE — 87624 HPV HI-RISK TYP POOLED RSLT: CPT | Performed by: OBSTETRICS & GYNECOLOGY

## 2024-08-20 LAB
C TRACH DNA SPEC QL PROBE+SIG AMP: NEGATIVE
N GONORRHOEA DNA SPEC QL NAA+PROBE: NEGATIVE

## 2024-08-21 LAB
HPV HR 12 DNA CVX QL NAA+PROBE: NEGATIVE
HPV16 DNA CVX QL NAA+PROBE: NEGATIVE
HPV18 DNA CVX QL NAA+PROBE: NEGATIVE
HUMAN PAPILLOMA VIRUS FINAL DIAGNOSIS: NORMAL

## 2024-08-23 LAB
BKR AP ASSOCIATED HPV REPORT: NORMAL
BKR LAB AP GYN ADEQUACY: NORMAL
BKR LAB AP GYN INTERPRETATION: NORMAL
BKR LAB AP LMP: NORMAL
BKR LAB AP PREVIOUS ABNL DX: NORMAL
BKR LAB AP PREVIOUS ABNORMAL: NORMAL
PATH REPORT.COMMENTS IMP SPEC: NORMAL
PATH REPORT.COMMENTS IMP SPEC: NORMAL
PATH REPORT.RELEVANT HX SPEC: NORMAL

## 2025-07-19 ENCOUNTER — HEALTH MAINTENANCE LETTER (OUTPATIENT)
Age: 35
End: 2025-07-19

## (undated) DEVICE — PACK C-SECTION LF PL15OTA83B

## (undated) DEVICE — GLOVE PROTEXIS BLUE W/NEU-THERA 7.0  2D73EB70

## (undated) DEVICE — SOL NACL 0.9% IRRIG 1000ML BOTTLE 07138-09

## (undated) DEVICE — SU VICRYL 4-0 PS-2 18" UND J496G

## (undated) DEVICE — GLOVE ESTEEM POWDER FREE SMT 6.5  2D72PT65

## (undated) DEVICE — SOL WATER IRRIG 1000ML BOTTLE 07139-09

## (undated) DEVICE — SU VICRYL 0 CT-1 36" J346H

## (undated) DEVICE — CATH TRAY FOLEY 16FR SILICONE 907416

## (undated) DEVICE — STRAP KNEE/BODY 31143004

## (undated) DEVICE — SUCTION CANISTER MEDIVAC LINER 1500ML W/LID 65651-515

## (undated) DEVICE — SU MONOCRYL 0 CT-1 36" Y346H

## (undated) DEVICE — STOCKING SLEEVE COMPRESSION CALF LG

## (undated) DEVICE — PREP CHLORAPREP 26ML TINTED ORANGE  260815

## (undated) DEVICE — BASIN SET MAJOR

## (undated) RX ORDER — FENTANYL CITRATE 50 UG/ML
INJECTION, SOLUTION INTRAMUSCULAR; INTRAVENOUS
Status: DISPENSED
Start: 2018-08-14

## (undated) RX ORDER — PHENYLEPHRINE HCL IN 0.9% NACL 1 MG/10 ML
SYRINGE (ML) INTRAVENOUS
Status: DISPENSED
Start: 2018-08-14

## (undated) RX ORDER — ONDANSETRON 2 MG/ML
INJECTION INTRAMUSCULAR; INTRAVENOUS
Status: DISPENSED
Start: 2018-08-14

## (undated) RX ORDER — OXYTOCIN/0.9 % SODIUM CHLORIDE 30/500 ML
PLASTIC BAG, INJECTION (ML) INTRAVENOUS
Status: DISPENSED
Start: 2018-08-14